# Patient Record
Sex: MALE | Race: WHITE | NOT HISPANIC OR LATINO | Employment: OTHER | URBAN - METROPOLITAN AREA
[De-identification: names, ages, dates, MRNs, and addresses within clinical notes are randomized per-mention and may not be internally consistent; named-entity substitution may affect disease eponyms.]

---

## 2019-10-17 ENCOUNTER — TELEPHONE (OUTPATIENT)
Dept: UROLOGY | Facility: AMBULATORY SURGERY CENTER | Age: 49
End: 2019-10-17

## 2019-12-26 ENCOUNTER — OFFICE VISIT (OUTPATIENT)
Dept: UROLOGY | Facility: CLINIC | Age: 49
End: 2019-12-26
Payer: COMMERCIAL

## 2019-12-26 ENCOUNTER — TELEPHONE (OUTPATIENT)
Dept: UROLOGY | Facility: MEDICAL CENTER | Age: 49
End: 2019-12-26

## 2019-12-26 ENCOUNTER — APPOINTMENT (EMERGENCY)
Dept: RADIOLOGY | Facility: HOSPITAL | Age: 49
End: 2019-12-26
Payer: COMMERCIAL

## 2019-12-26 ENCOUNTER — HOSPITAL ENCOUNTER (EMERGENCY)
Facility: HOSPITAL | Age: 49
Discharge: HOME | End: 2019-12-26
Admitting: EMERGENCY MEDICINE
Payer: COMMERCIAL

## 2019-12-26 VITALS
SYSTOLIC BLOOD PRESSURE: 119 MMHG | TEMPERATURE: 98 F | BODY MASS INDEX: 36.4 KG/M2 | HEART RATE: 77 BPM | WEIGHT: 260 LBS | OXYGEN SATURATION: 99 % | HEIGHT: 71 IN | DIASTOLIC BLOOD PRESSURE: 74 MMHG | RESPIRATION RATE: 15 BRPM

## 2019-12-26 VITALS
DIASTOLIC BLOOD PRESSURE: 84 MMHG | HEIGHT: 71 IN | BODY MASS INDEX: 37.24 KG/M2 | SYSTOLIC BLOOD PRESSURE: 134 MMHG | WEIGHT: 266 LBS | HEART RATE: 77 BPM

## 2019-12-26 DIAGNOSIS — N52.9 ERECTILE DYSFUNCTION, UNSPECIFIED ERECTILE DYSFUNCTION TYPE: Primary | ICD-10-CM

## 2019-12-26 DIAGNOSIS — S83.92XA LEFT KNEE SPRAIN: Primary | ICD-10-CM

## 2019-12-26 DIAGNOSIS — S93.402A LEFT ANKLE SPRAIN: ICD-10-CM

## 2019-12-26 LAB
SL AMB  POCT GLUCOSE, UA: NORMAL
SL AMB LEUKOCYTE ESTERASE,UA: NORMAL
SL AMB POCT BILIRUBIN,UA: NORMAL
SL AMB POCT BLOOD,UA: NORMAL
SL AMB POCT CLARITY,UA: CLEAR
SL AMB POCT COLOR,UA: YELLOW
SL AMB POCT KETONES,UA: NORMAL
SL AMB POCT NITRITE,UA: NORMAL
SL AMB POCT PH,UA: 5
SL AMB POCT SPECIFIC GRAVITY,UA: 1.02
SL AMB POCT URINE PROTEIN: NORMAL
SL AMB POCT UROBILINOGEN: 0.2

## 2019-12-26 PROCEDURE — 81002 URINALYSIS NONAUTO W/O SCOPE: CPT | Performed by: PHYSICIAN ASSISTANT

## 2019-12-26 PROCEDURE — 73610 X-RAY EXAM OF ANKLE: CPT

## 2019-12-26 PROCEDURE — 99283 EMERGENCY DEPT VISIT LOW MDM: CPT

## 2019-12-26 PROCEDURE — 99284 EMERGENCY DEPT VISIT MOD MDM: CPT | Performed by: PHYSICIAN ASSISTANT

## 2019-12-26 PROCEDURE — 73564 X-RAY EXAM KNEE 4 OR MORE: CPT

## 2019-12-26 PROCEDURE — 99203 OFFICE O/P NEW LOW 30 MIN: CPT | Performed by: PHYSICIAN ASSISTANT

## 2019-12-26 RX ORDER — ATORVASTATIN CALCIUM 80 MG/1
TABLET, FILM COATED ORAL
COMMUNITY
Start: 2019-10-22 | End: 2020-10-27 | Stop reason: CLARIF

## 2019-12-26 RX ORDER — TACROLIMUS 1 MG/1
2 CAPSULE ORAL EVERY 12 HOURS SCHEDULED
COMMUNITY
Start: 2018-01-03

## 2019-12-26 RX ORDER — CIPROFLOXACIN HYDROCHLORIDE 3.5 MG/ML
SOLUTION/ DROPS TOPICAL
COMMUNITY
Start: 2019-11-26 | End: 2021-04-06 | Stop reason: ALTCHOICE

## 2019-12-26 RX ORDER — CARVEDILOL 12.5 MG/1
TABLET ORAL
COMMUNITY
Start: 2019-12-17 | End: 2020-10-27 | Stop reason: CLARIF

## 2019-12-26 RX ORDER — PANTOPRAZOLE SODIUM 40 MG/1
TABLET, DELAYED RELEASE ORAL
COMMUNITY
Start: 2019-11-26 | End: 2020-10-27 | Stop reason: CLARIF

## 2019-12-26 RX ORDER — GLUCOSAMINE/CHONDROIT/AO MVIT5 400-300 MG
1 TABLET ORAL
COMMUNITY

## 2019-12-26 RX ORDER — INSULIN DEGLUDEC INJECTION 100 U/ML
INJECTION, SOLUTION SUBCUTANEOUS
COMMUNITY
Start: 2019-11-07 | End: 2020-10-27 | Stop reason: ALTCHOICE

## 2019-12-26 RX ORDER — INSULIN LISPRO 100 [IU]/ML
INJECTION, SOLUTION INTRAVENOUS; SUBCUTANEOUS
COMMUNITY
Start: 2019-11-04 | End: 2020-10-27 | Stop reason: ALTCHOICE

## 2019-12-26 RX ORDER — PREDNISONE 1 MG/1
5 TABLET ORAL DAILY
COMMUNITY
Start: 2018-04-02

## 2019-12-26 RX ORDER — ACETAMINOPHEN 325 MG/1
650 TABLET ORAL ONCE
Status: COMPLETED | OUTPATIENT
Start: 2019-12-26 | End: 2019-12-26

## 2019-12-26 RX ORDER — PANTOPRAZOLE SODIUM 40 MG/1
40 TABLET, DELAYED RELEASE ORAL DAILY
COMMUNITY
Start: 2019-08-28

## 2019-12-26 RX ORDER — AMLODIPINE BESYLATE 10 MG/1
10 TABLET ORAL DAILY
COMMUNITY
End: 2020-10-27 | Stop reason: ALTCHOICE

## 2019-12-26 RX ORDER — TRAMADOL HYDROCHLORIDE 50 MG/1
50 TABLET ORAL EVERY 6 HOURS PRN
Qty: 4 TABLET | Refills: 0 | Status: SHIPPED | OUTPATIENT
Start: 2019-12-26 | End: 2020-10-27 | Stop reason: ALTCHOICE

## 2019-12-26 RX ORDER — SILDENAFIL 100 MG/1
TABLET, FILM COATED ORAL
COMMUNITY
Start: 2019-08-01 | End: 2020-10-27 | Stop reason: ALTCHOICE

## 2019-12-26 RX ORDER — TACROLIMUS 1 MG/1
1 CAPSULE ORAL EVERY 12 HOURS SCHEDULED
COMMUNITY
End: 2020-10-27 | Stop reason: CLARIF

## 2019-12-26 RX ORDER — MYCOPHENOLIC ACID 180 MG/1
720 TABLET, DELAYED RELEASE ORAL 2 TIMES DAILY
COMMUNITY

## 2019-12-26 RX ORDER — CARVEDILOL 12.5 MG/1
12.5 TABLET ORAL 2 TIMES DAILY WITH MEALS
COMMUNITY
Start: 2018-02-18

## 2019-12-26 RX ORDER — ATORVASTATIN CALCIUM 80 MG/1
80 TABLET, FILM COATED ORAL DAILY
COMMUNITY
Start: 2018-04-03

## 2019-12-26 RX ORDER — LEVOFLOXACIN 500 MG/1
TABLET, FILM COATED ORAL
COMMUNITY
Start: 2019-11-27 | End: 2020-10-27 | Stop reason: ALTCHOICE

## 2019-12-26 RX ORDER — INSULIN LISPRO 100 [IU]/ML
40 INJECTION, SOLUTION INTRAVENOUS; SUBCUTANEOUS
COMMUNITY
Start: 2019-11-07 | End: 2020-10-27 | Stop reason: ALTCHOICE

## 2019-12-26 RX ADMIN — ACETAMINOPHEN 650 MG: 325 TABLET, FILM COATED ORAL at 12:23

## 2019-12-26 NOTE — ED PROVIDER NOTES
History  Chief Complaint   Patient presents with    Knee Injury     States slipped and twisted L knee and ankle last week  Reports prior surgery to L knee, and c/o medial knee pain    Ankle Injury     20-year-old male comes in today complaining of left knee pain and left posterior ankle pain status post slipping on ice 1 week ago and falling  He reports that he thought the pain would be better by now, however it isn't  Reports some difficulty ambulating due to the pain in his ankle and knee  Prior to Admission Medications   Prescriptions Last Dose Informant Patient Reported? Taking? Cholecalciferol 50 MCG (2000 UT) CAPS Not Taking at Unknown time Self Yes No   Sig: Take 1 capsule by mouth   HUMALOG KWIKPEN 100 units/mL injection pen  Self Yes Yes   ONE TOUCH ULTRA TEST test strip  Self Yes Yes   Sig: use 1 TEST STRIP to TEST BLOOD SUGAR four times a day   Pediatric Multivitamins-Fl (POLY-VI-VIKI) 0 25 MG/ML SUSP Not Taking at Unknown time Self Yes No   Sig: Take 1 mL by mouth   TRESIBA FLEXTOUCH 100 units/mL injection pen Not Taking at Unknown time Self Yes No   amLODIPine (NORVASC) 10 mg tablet   Yes Yes   Sig: Take 10 mg by mouth daily   atorvastatin (LIPITOR) 80 mg tablet  Self Yes Yes   atorvastatin (LIPITOR) 80 mg tablet Not Taking at Unknown time Self Yes No   carvedilol (COREG) 12 5 mg tablet  Self Yes Yes   carvedilol (COREG) 12 5 mg tablet Not Taking at Unknown time Self Yes No   ciprofloxacin (CILOXAN) 0 3 % ophthalmic solution Not Taking at Unknown time Self Yes No   insulin degludec (TRESIBA FLEXTOUCH) 100 units/mL injection pen  Self Yes Yes   Sig: INJECT SUBCUTANEOUSLY 80  UNITS DAILY   insulin lispro (HUMALOG KWIKPEN) 100 units/mL injection pen  Self Yes Yes   Sig: INJECT SUBCUTANEOUSLY 16  UNITS FOR BREAKFAST, LUNCH, AND 19 UNITS FOR DINNER  WITH ADDITIONAL COVERAGE  SCALE  MAX 60 UNITS DAILY     levofloxacin (LEVAQUIN) 500 mg tablet Not Taking at Unknown time Self Yes No mycophenolic acid (MYFORTIC) 664 mg EC tablet   Yes Yes   Sig: Take 720 mg by mouth 2 (two) times a day   pantoprazole (PROTONIX) 40 mg tablet  Self Yes Yes   pantoprazole (PROTONIX) 40 mg tablet Not Taking at Unknown time Self Yes No   predniSONE 5 mg tablet  Self Yes Yes   pseudoephedrine-acetaminophen (TYLENOL SINUS)  MG TABS Not Taking at Unknown time Self Yes No   Sig: Take 81 mg by mouth daily   sildenafil (VIAGRA) 100 mg tablet  Self Yes Yes   tacrolimus (PROGRAF) 1 mg capsule  Self Yes Yes   Sig: Take 2 mg by mouth every 12 (twelve) hours    tacrolimus (PROGRAF) 1 mg capsule   Yes Yes   Sig: Take 1 mg by mouth every 12 (twelve) hours   ticagrelor (BRILINTA) 90 MG   Yes Yes   Sig: Take 90 mg by mouth every 12 (twelve) hours      Facility-Administered Medications: None       Past Medical History:   Diagnosis Date    [facility]     Hyperlipidemia     Hypertension     Renal disorder     kidney transplant L side    Tear meniscus knee        Past Surgical History:   Procedure Laterality Date    APPENDECTOMY      KNEE SURGERY      NEPHRECTOMY TRANSPLANTED ORGAN Left        History reviewed  No pertinent family history  I have reviewed and agree with the history as documented  Social History     Tobacco Use    Smoking status: Light Tobacco Smoker     Types: Cigarettes    Smokeless tobacco: Never Used    Tobacco comment: 5 cigaretes per day    Substance Use Topics    Alcohol use: Yes     Comment: social    Drug use: Yes     Types: Marijuana        Review of Systems   Constitutional: Negative for fatigue and fever  HENT: Negative for ear pain, rhinorrhea and sore throat  Eyes: Negative for visual disturbance  Respiratory: Negative for cough and shortness of breath  Cardiovascular: Negative for chest pain  Gastrointestinal: Negative for abdominal pain, diarrhea, nausea and vomiting     Musculoskeletal: Positive for arthralgias, gait problem and joint swelling  Negative for back pain  Neurological: Negative for headaches  Physical Exam  Physical Exam   Constitutional: He is oriented to person, place, and time  He appears well-developed and well-nourished  HENT:   Head: Normocephalic and atraumatic  Eyes: Conjunctivae and EOM are normal    Neck: Normal range of motion  Pulmonary/Chest: Effort normal    Musculoskeletal: Normal range of motion  Legs:  Neurological: He is alert and oriented to person, place, and time  Skin: Skin is warm and dry  Psychiatric: He has a normal mood and affect  His behavior is normal        Vital Signs  ED Triage Vitals [12/26/19 1154]   Temperature Pulse Respirations Blood Pressure SpO2   98 °F (36 7 °C) 77 15 119/74 99 %      Temp Source Heart Rate Source Patient Position - Orthostatic VS BP Location FiO2 (%)   Oral Monitor Sitting Left arm --      Pain Score       5           Vitals:    12/26/19 1154   BP: 119/74   Pulse: 77   Patient Position - Orthostatic VS: Sitting         Visual Acuity      ED Medications  Medications   acetaminophen (TYLENOL) tablet 650 mg (650 mg Oral Given 12/26/19 1223)       Diagnostic Studies  Results Reviewed     None                 XR ankle 3+ views LEFT   ED Interpretation by Sadie Barnett PA-C (12/26 1258)   Atherosclerotic disease, however no acute fracture      XR knee 4+ vw left injury   ED Interpretation by Sadie Barnett PA-C (12/26 1300)   NAD                 Procedures  Procedures         ED Course                               MDM      Disposition  Final diagnoses:   Left knee sprain   Left ankle sprain     Time reflects when diagnosis was documented in both MDM as applicable and the Disposition within this note     Time User Action Codes Description Comment    12/26/2019  1:00 PM Anh De León Add [S83  92XA] Left knee sprain     12/26/2019  1:01 PM Anh De León Add [P22 963A] Left ankle sprain       ED Disposition     ED Disposition Condition Date/Time Comment    Discharge Stable Thu Dec 26, 2019  1:00 PM Albert Jim discharge to home/self care  Follow-up Information     Follow up With Specialties Details Why Contact Info Additional [phone] [facility] Schedule an appointment as soon as possible for a visit   [address] [place] [id number]  600 [facility], [address], [place], [place], [address] [phone]          Patient's Medications   Discharge Prescriptions    No medications on file     No discharge procedures on file      ED Provider  Electronically Signed by           José Manuel Pedersen PA-C  12/26/19 0973

## 2019-12-26 NOTE — TELEPHONE ENCOUNTER
I called [facility] to verify what exactly transpired with the Trimix 10-30-1 prescription  I spoke with Ivette Silva Ph and she tried multiple time to explain to the patient that Trimix is a non-covered compounded medication and is non-covered  The pharmacist is correct in that Trimix is non-covered

## 2019-12-26 NOTE — TELEPHONE ENCOUNTER
Patient seen by Wilmer Alvarez in the [facility]  Patient called to report that the new Trimix medication needs a prior authorization  His Insurance card for medication is   [facility] [employer]   ID [id number]      Jammie Curry #[id number]      [facility]  [address] [place] [address] # Raymon Armendariz       Patient can be reached at [phone]  Thank you

## 2019-12-26 NOTE — PROGRESS NOTES
1  Erectile dysfunction, unspecified erectile dysfunction type  POCT urine dip         Assessment and plan:       1  Erectile dysfunction  -I reviewed with the patient that he has failed maximum strength oral medications  Reviewed options of PDE 5 inhibitors in conjunction with a vacuum assisted device versus intracavernosal injections  At this point patient will likely benefit from intracavernosal injections  Risk profile reviewed  Patient verbalized understanding  Patient will follow up in the near future for Tri Mix teaching  He was instructed to  his medication prior to his appointment and bring with him for education  Questions answered  2  History of renal transplant  -recommend hydration, avoidance of next for toxin, and continued following with his nephrologist         Cora Sanabria PA-C      Chief Complaint     New patient erectile dysfunction    History of Present Illness     Jaron Patino is a 44 y o  male presenting today as a new patient for erectile dysfunction  Patient states that he has had erectile dysfunction over the course of the past 10 years  This likely attributed due to his history of uncontrolled diabetes and hypertension  Patient previously had been tried on Cialis without benefit  He has been managed on Viagra 100 mg as needed for period of time  He states that this previously was efficacious however since has discontinued  He is only able to achieve erection for approximately 5 minutes and then has prompt detumescence  Patient reports he previously had seen a urologist prior to his renal transplant and had tried injection therapy approximately 1 time  This was not efficacious and then was lost to follow-up due to his other medical conditions  Patient is overall happy with his lower urinary tract symptoms  Denies any gross hematuria, dysuria, suprapubic pressure, flank pain, fevers, or chills    Follows closely with a nephrologist     Medical comorbidities include diabetes, chronic kidney disease with a history of renal transplant  Review of Systems     Review of Systems   Constitutional: Negative for activity change, appetite change, chills, diaphoresis, fatigue, fever and unexpected weight change  Respiratory: Negative for chest tightness and shortness of breath  Cardiovascular: Negative for chest pain, palpitations and leg swelling  Gastrointestinal: Negative for abdominal distention, abdominal pain, constipation, diarrhea, nausea and vomiting  Genitourinary: Negative for decreased urine volume, difficulty urinating, dysuria, enuresis, flank pain, frequency, genital sores, hematuria and urgency  Musculoskeletal: Negative for back pain, gait problem and myalgias  Skin: Negative for color change, pallor, rash and wound  Psychiatric/Behavioral: Negative for behavioral problems  The patient is not nervous/anxious  Allergies     Allergies   Allergen Reactions    Other Hives     Vinegar       Physical Exam     Physical Exam   Constitutional: He is oriented to person, place, and time  He appears well-developed and well-nourished  No distress  HENT:   Head: Normocephalic and atraumatic  Right Ear: External ear normal    Left Ear: External ear normal    Eyes: Conjunctivae are normal  Right eye exhibits no discharge  Left eye exhibits no discharge  Neck: Normal range of motion  No tracheal deviation present  Pulmonary/Chest: Effort normal  No respiratory distress  Musculoskeletal: Normal range of motion  He exhibits no edema or deformity  Neurological: He is alert and oriented to person, place, and time  Skin: Skin is warm and dry  No rash noted  He is not diaphoretic  No erythema  Psychiatric: He has a normal mood and affect   His behavior is normal          Vital Signs     Vitals:    12/26/19 1016   BP: 134/84   BP Location: Left arm   Patient Position: Sitting   Cuff Size: Standard   Pulse: 77   Weight: 121 kg (266 lb)   Height: 5' 11" (1 803 m)         Current Medications       Current Outpatient Medications:     atorvastatin (LIPITOR) 80 mg tablet, , Disp: , Rfl:     atorvastatin (LIPITOR) 80 mg tablet, , Disp: , Rfl:     carvedilol (COREG) 12 5 mg tablet, , Disp: , Rfl:     carvedilol (COREG) 12 5 mg tablet, , Disp: , Rfl:     Cholecalciferol 50 MCG (2000 UT) CAPS, Take 1 capsule by mouth, Disp: , Rfl:     ciprofloxacin (CILOXAN) 0 3 % ophthalmic solution, , Disp: , Rfl:     HUMALOG KWIKPEN 100 units/mL injection pen, , Disp: , Rfl:     insulin degludec (TRESIBA FLEXTOUCH) 100 units/mL injection pen, INJECT SUBCUTANEOUSLY 80  UNITS DAILY, Disp: , Rfl:     insulin lispro (HUMALOG KWIKPEN) 100 units/mL injection pen, INJECT SUBCUTANEOUSLY 16  UNITS FOR BREAKFAST, LUNCH, AND 19 UNITS FOR DINNER  WITH ADDITIONAL COVERAGE  SCALE  MAX 60 UNITS DAILY  , Disp: , Rfl:     levofloxacin (LEVAQUIN) 500 mg tablet, , Disp: , Rfl:     ONE TOUCH ULTRA TEST test strip, use 1 TEST STRIP to TEST BLOOD SUGAR four times a day, Disp: , Rfl: 0    pantoprazole (PROTONIX) 40 mg tablet, , Disp: , Rfl:     pantoprazole (PROTONIX) 40 mg tablet, , Disp: , Rfl:     Pediatric Multivitamins-Fl (POLY-VI-VIKI) 0 25 MG/ML SUSP, Take 1 mL by mouth, Disp: , Rfl:     predniSONE 5 mg tablet, , Disp: , Rfl:     pseudoephedrine-acetaminophen (TYLENOL SINUS)  MG TABS, Take 81 mg by mouth daily, Disp: , Rfl:     sildenafil (VIAGRA) 100 mg tablet, , Disp: , Rfl:     tacrolimus (PROGRAF) 1 mg capsule, Take 2 mg by mouth every 12 (twelve) hours , Disp: , Rfl:     TRESIBA FLEXTOUCH 100 units/mL injection pen, , Disp: , Rfl:     amLODIPine (NORVASC) 10 mg tablet, Take 10 mg by mouth daily, Disp: , Rfl:     mycophenolic acid (MYFORTIC) 268 mg EC tablet, Take 720 mg by mouth 2 (two) times a day, Disp: , Rfl:     tacrolimus (PROGRAF) 1 mg capsule, Take 1 mg by mouth every 12 (twelve) hours, Disp: , Rfl:     ticagrelor (BRILINTA) 90 MG, Take 90 mg by mouth every 12 (twelve) hours, Disp: , Rfl:     traMADol (ULTRAM) 50 mg tablet, Take 1 tablet (50 mg total) by mouth every 6 (six) hours as needed for moderate pain or severe pain for up to 4 doses, Disp: 4 tablet, Rfl: 0  No current facility-administered medications for this visit  Active Problems     There is no problem list on file for this patient  Past Medical History     Past Medical History:   Diagnosis Date    [facility] 1 5, [facility] as type [facility]     Hyperlipidemia     Hypertension     Renal disorder     kidney transplant L side    Tear meniscus knee          Surgical History     Past Surgical History:   Procedure Laterality Date    APPENDECTOMY      KNEE SURGERY      NEPHRECTOMY TRANSPLANTED ORGAN Left          Family History     History reviewed  No pertinent family history        Social History     Social History       Radiology 99

## 2020-01-08 ENCOUNTER — OFFICE VISIT (OUTPATIENT)
Dept: UROLOGY | Facility: CLINIC | Age: 50
End: 2020-01-08
Payer: COMMERCIAL

## 2020-01-08 VITALS
SYSTOLIC BLOOD PRESSURE: 140 MMHG | WEIGHT: 255 LBS | DIASTOLIC BLOOD PRESSURE: 98 MMHG | BODY MASS INDEX: 35.7 KG/M2 | HEART RATE: 80 BPM | HEIGHT: 71 IN

## 2020-01-08 DIAGNOSIS — N52.9 ERECTILE DYSFUNCTION, UNSPECIFIED ERECTILE DYSFUNCTION TYPE: Primary | ICD-10-CM

## 2020-01-08 PROCEDURE — 54235 NJX CORPORA CAVERNOSA RX AGT: CPT | Performed by: PHYSICIAN ASSISTANT

## 2020-01-08 NOTE — PROGRESS NOTES
1/8/2020  Alexsandra Epstein  1970  [id number]      Assessment/Plan  Erectile dysfunction    Discussion  Cha Chinchilla is a 44 y o  male with erectile dysfunction  He was provided verbal and physical demonstration of intracavernosal injection use  He was instructed on storage, disposal, and titration of the medication  He was instructed on hospital precautions for a priapism  He was able to demonstrate understanding of injection use  All questions were answered  Follow-up 6 months for symptom reassessment with PSA PTV    History of Present Illness  44 y o  male with ED presents today for intracavernosal injection teaching  Vitals:    01/08/20 1126   BP: 140/98   Pulse: 80   Weight: 116 kg (255 lb)   Height: 5' 11" (1 803 m)       Procedure    Procedure: intracavernosal injection  Indication: Erectile dysfunction  Discussed:  Proper technique and instruction for intracavernosal injection were explained to the patient  risks of injection including but not limited to pain, bleeding, infection, fibrosis, and priapism (including the potential complications of priapism) were discussed  Procedure: The skin overlying the injection site was then prepped with Alcohol  0 1 ml of was injected into the corpora cavernosum  Patient Status:  the patient was closely monitored for 10 minutes and reassessed  He tolerated the procedure well  Response to intracavernosal injection was fair   Complications:  No complications noted  Instructions:  the patient was counseled about priapism and instructed to return to the clinic or the emergency room if his erection lasted up to 4 hours  the patient expressed understanding of the injection technique and felt able to perform self-injection

## 2020-01-15 NOTE — TELEPHONE ENCOUNTER
Patient managed by Hakeem John is calling to say he did pay out of pocket for medication    He was waiting for a response call to see if there would be prior authorization to help with cost  Please advise

## 2020-01-16 NOTE — TELEPHONE ENCOUNTER
I returned the call to the patient and informed him that compounded medication are NON-COVERED by insurance  Patient verbalized understanding  No further action required

## 2020-01-28 ENCOUNTER — TELEPHONE (OUTPATIENT)
Dept: UROLOGY | Facility: MEDICAL CENTER | Age: 50
End: 2020-01-28

## 2020-01-28 NOTE — TELEPHONE ENCOUNTER
Patient was seen by Cindy Downey in [facility] office  Patient is calling to advise medication for his Ed is not working    Patient requesting to speak to Cindy Downey

## 2020-01-29 NOTE — TELEPHONE ENCOUNTER
Called and left second message for patient to call the office back  Message was left on "home" and "home phone" numbers  Office number was left in the message

## 2020-01-30 NOTE — TELEPHONE ENCOUNTER
Called and left third message for patient to call the office back  Message was left on "home phone" numbers  Called patient on other number and was able to speak to him  Patient reports that he is using the trimix injections and that he was told that he could go up to 8 on the injections  Patient reports that he has gone up to 8 and it is not working at all  Patient wants to know what else he can do  Please advise  Thank you

## 2020-02-03 ENCOUNTER — TELEPHONE (OUTPATIENT)
Dept: UROLOGY | Facility: AMBULATORY SURGERY CENTER | Age: 50
End: 2020-02-03

## 2020-02-03 NOTE — TELEPHONE ENCOUNTER
We can increase the strength of the injection - please have patient confirm which strength he is currently on and we can titrate up accordingly  With a stronger strength patient will need to restart down at 0 1mL injections

## 2020-02-03 NOTE — TELEPHONE ENCOUNTER
Called and asked patient the strength of the injection he is taking  He looked at his bottle and said it was 10 mcg - 30 mg  Please advise

## 2020-02-03 NOTE — TELEPHONE ENCOUNTER
Called and left message for patient  Made him aware that we have sent in a new script to [facility] with an increase in the strength of the injection  In message stated that with a stronger strength he will need to restart down at 0 1mL and titrate up if needed

## 2020-02-03 NOTE — TELEPHONE ENCOUNTER
I have completed an order sent for [facility] for this medication

## 2020-02-06 NOTE — TELEPHONE ENCOUNTER
I apologize for the misunderstanding  I have completed the order for TriMix + and have provided it to clerical staff to be faxed to the pharmacy

## 2020-02-06 NOTE — TELEPHONE ENCOUNTER
Called and left message for patient  In message stated that we have completed the [facility] for TriMix [facility]

## 2020-02-06 NOTE — TELEPHONE ENCOUNTER
Corinna from [facility] called and said patient is asking for a stronger strength for Trimix  Please call pharmacy at [phone]

## 2020-02-06 NOTE — TELEPHONE ENCOUNTER
Patient called to say he the prescription that was sent in is the same strength and he did not pick it up  Stated he would like to speak to nurse

## 2020-03-06 NOTE — PROGRESS NOTES
Assessment and plan:       1  Erectile dysfunction  - Patient has failed oral PDE 5 inhibitors and inter cavernosal injections  - At this point the patient is interested in pursuing penile prosthesis  Linda Maurice - He has been referred to Dr Raimundo Maharaj who specializes in this type of treatment  He will follow up in the near future with this provider  Joanna Denver, PA-C      Chief Complaint     Chief Complaint   Patient presents with    Erectile Dysfunction         History of Present Illness     Gene Hamm is a 44 y o  male patient known to our service for erectile dysfunction  He underwent teaching for Tri Mix injection use on 01/08/2020  Patient contact our office on 01/28/2020 to report that he had tried up to 0 8 mL without satisfactory response  He was then sent a prescription of Tri Mix Plus  He reports that he has used up to 0 8 mL of Tri Mix +without any erectile response  The patient is on many prescription medications and would like to limit further medications if possible  At this time he would like to consider penile prosthesis  Laboratory     No results found for: CREATININE    No results found for: PSA    No results found for this or any previous visit (from the past 1 hour(s))  Review of Systems     Review of Systems   Constitutional: Negative for chills and fever  HENT: Negative  Eyes: Negative  Respiratory: Negative for shortness of breath  Cardiovascular: Negative for chest pain  Gastrointestinal: Negative for constipation, diarrhea, nausea and vomiting  Genitourinary: Negative for difficulty urinating, dysuria, enuresis, flank pain, frequency, hematuria and urgency  Musculoskeletal: Negative  Allergies     Allergies   Allergen Reactions    Other Hives     Vinegar       Physical Exam     Physical Exam   Constitutional: He is oriented to person, place, and time  He appears well-developed and well-nourished  No distress     HENT:   Head: Normocephalic and atraumatic  Right Ear: External ear normal    Left Ear: External ear normal    Nose: Nose normal    Eyes: Right eye exhibits no discharge  Left eye exhibits no discharge  No scleral icterus  Cardiovascular: Normal rate  Pulmonary/Chest: Effort normal    Musculoskeletal:   Ambulates independently   Neurological: He is alert and oriented to person, place, and time  Skin: Skin is warm and dry  He is not diaphoretic  Psychiatric: He has a normal mood and affect  His behavior is normal  Judgment and thought content normal    Nursing note and vitals reviewed  Vital Signs     Vitals:    03/10/20 1330   BP: 140/76   BP Location: Right arm   Patient Position: Sitting   Cuff Size: Adult   Pulse: 95   Weight: 117 kg (259 lb)   Height: 5' 11" (1 803 m)         Current Medications       Current Outpatient Medications:     amLODIPine (NORVASC) 10 mg tablet, Take 10 mg by mouth daily, Disp: , Rfl:     atorvastatin (LIPITOR) 80 mg tablet, , Disp: , Rfl:     atorvastatin (LIPITOR) 80 mg tablet, , Disp: , Rfl:     carvedilol (COREG) 12 5 mg tablet, , Disp: , Rfl:     carvedilol (COREG) 12 5 mg tablet, , Disp: , Rfl:     Cholecalciferol 50 MCG (2000 UT) CAPS, Take 1 capsule by mouth, Disp: , Rfl:     ciprofloxacin (CILOXAN) 0 3 % ophthalmic solution, , Disp: , Rfl:     HUMALOG KWIKPEN 100 units/mL injection pen, , Disp: , Rfl:     insulin degludec (TRESIBA FLEXTOUCH) 100 units/mL injection pen, INJECT SUBCUTANEOUSLY 80  UNITS DAILY, Disp: , Rfl:     insulin lispro (HUMALOG KWIKPEN) 100 units/mL injection pen, INJECT SUBCUTANEOUSLY 16  UNITS FOR BREAKFAST, LUNCH, AND 19 UNITS FOR DINNER  WITH ADDITIONAL COVERAGE  SCALE  MAX 60 UNITS DAILY  , Disp: , Rfl:     levofloxacin (LEVAQUIN) 500 mg tablet, , Disp: , Rfl:     mycophenolic acid (MYFORTIC) 168 mg EC tablet, Take 720 mg by mouth 2 (two) times a day, Disp: , Rfl:     ONE TOUCH ULTRA TEST test strip, use 1 TEST STRIP to TEST BLOOD SUGAR four times a day, Disp: , Rfl: 0    pantoprazole (PROTONIX) 40 mg tablet, , Disp: , Rfl:     pantoprazole (PROTONIX) 40 mg tablet, , Disp: , Rfl:     Pediatric Multivitamins-Fl (POLY-VI-VIKI) 0 25 MG/ML SUSP, Take 1 mL by mouth, Disp: , Rfl:     predniSONE 5 mg tablet, , Disp: , Rfl:     pseudoephedrine-acetaminophen (TYLENOL SINUS)  MG TABS, Take 81 mg by mouth daily, Disp: , Rfl:     sildenafil (VIAGRA) 100 mg tablet, , Disp: , Rfl:     tacrolimus (PROGRAF) 1 mg capsule, Take 2 mg by mouth every 12 (twelve) hours , Disp: , Rfl:     tacrolimus (PROGRAF) 1 mg capsule, Take 1 mg by mouth every 12 (twelve) hours, Disp: , Rfl:     ticagrelor (BRILINTA) 90 MG, Take 90 mg by mouth every 12 (twelve) hours, Disp: , Rfl:     traMADol (ULTRAM) 50 mg tablet, Take 1 tablet (50 mg total) by mouth every 6 (six) hours as needed for moderate pain or severe pain for up to 4 doses, Disp: 4 tablet, Rfl: 0    TRESIBA FLEXTOUCH 100 units/mL injection pen, , Disp: , Rfl:       Active Problems     There is no problem list on file for this patient  Past Medical History     Past Medical History:   Diagnosis Date    Diabetes 1 5, managed as type 2 ([facility] [address] )     Hyperlipidemia     Hypertension     Renal disorder     kidney transplant L side    Tear meniscus knee          Surgical History     Past Surgical History:   Procedure Laterality Date    APPENDECTOMY      KNEE SURGERY      NEPHRECTOMY TRANSPLANTED ORGAN Left          Family History     History reviewed  No pertinent family history        Social History     Social History       Radiology

## 2020-03-10 ENCOUNTER — OFFICE VISIT (OUTPATIENT)
Dept: UROLOGY | Facility: CLINIC | Age: 50
End: 2020-03-10
Payer: COMMERCIAL

## 2020-03-10 VITALS
WEIGHT: 259 LBS | HEART RATE: 95 BPM | SYSTOLIC BLOOD PRESSURE: 140 MMHG | DIASTOLIC BLOOD PRESSURE: 76 MMHG | BODY MASS INDEX: 36.26 KG/M2 | HEIGHT: 71 IN

## 2020-03-10 DIAGNOSIS — N52.9 ERECTILE DYSFUNCTION, UNSPECIFIED ERECTILE DYSFUNCTION TYPE: Primary | ICD-10-CM

## 2020-03-10 PROCEDURE — 99213 OFFICE O/P EST LOW 20 MIN: CPT | Performed by: PHYSICIAN ASSISTANT

## 2020-10-27 ENCOUNTER — OFFICE VISIT (OUTPATIENT)
Dept: INTERNAL MEDICINE CLINIC | Facility: CLINIC | Age: 50
End: 2020-10-27
Payer: COMMERCIAL

## 2020-10-27 VITALS
WEIGHT: 262.8 LBS | TEMPERATURE: 97.6 F | DIASTOLIC BLOOD PRESSURE: 86 MMHG | SYSTOLIC BLOOD PRESSURE: 122 MMHG | HEIGHT: 71 IN | BODY MASS INDEX: 36.79 KG/M2 | HEART RATE: 74 BPM

## 2020-10-27 DIAGNOSIS — Z94.0 KIDNEY TRANSPLANT STATUS, LIVING RELATED DONOR: ICD-10-CM

## 2020-10-27 DIAGNOSIS — E11.69 MIXED HYPERLIPIDEMIA DUE TO TYPE 2 DIABETES MELLITUS (HCC): Primary | ICD-10-CM

## 2020-10-27 DIAGNOSIS — Z79.4 TYPE 2 DIABETES MELLITUS WITH HYPERGLYCEMIA, WITH LONG-TERM CURRENT USE OF INSULIN (HCC): ICD-10-CM

## 2020-10-27 DIAGNOSIS — E66.9 OBESITY (BMI 30-39.9): ICD-10-CM

## 2020-10-27 DIAGNOSIS — I10 ESSENTIAL HYPERTENSION: ICD-10-CM

## 2020-10-27 DIAGNOSIS — Z79.4 INSULIN LONG-TERM USE (HCC): ICD-10-CM

## 2020-10-27 DIAGNOSIS — E78.2 MIXED HYPERLIPIDEMIA DUE TO TYPE 2 DIABETES MELLITUS (HCC): Primary | ICD-10-CM

## 2020-10-27 DIAGNOSIS — E11.65 TYPE 2 DIABETES MELLITUS WITH HYPERGLYCEMIA, WITH LONG-TERM CURRENT USE OF INSULIN (HCC): ICD-10-CM

## 2020-10-27 DIAGNOSIS — N28.9 RENAL DISORDER: ICD-10-CM

## 2020-10-27 DIAGNOSIS — Z12.11 COLON CANCER SCREENING: ICD-10-CM

## 2020-10-27 PROCEDURE — 3725F SCREEN DEPRESSION PERFORMED: CPT | Performed by: NURSE PRACTITIONER

## 2020-10-27 PROCEDURE — 3008F BODY MASS INDEX DOCD: CPT | Performed by: NURSE PRACTITIONER

## 2020-10-27 PROCEDURE — 3079F DIAST BP 80-89 MM HG: CPT | Performed by: NURSE PRACTITIONER

## 2020-10-27 PROCEDURE — 99214 OFFICE O/P EST MOD 30 MIN: CPT | Performed by: NURSE PRACTITIONER

## 2020-10-27 PROCEDURE — 3074F SYST BP LT 130 MM HG: CPT | Performed by: NURSE PRACTITIONER

## 2020-10-27 PROCEDURE — 3066F NEPHROPATHY DOC TX: CPT | Performed by: NURSE PRACTITIONER

## 2020-10-27 RX ORDER — OFLOXACIN 3 MG/ML
SOLUTION/ DROPS OPHTHALMIC
COMMUNITY
Start: 2020-10-16 | End: 2021-04-06 | Stop reason: ALTCHOICE

## 2020-10-27 RX ORDER — LINAGLIPTIN 5 MG/1
5 TABLET, FILM COATED ORAL DAILY
COMMUNITY
Start: 2020-08-21 | End: 2020-10-27 | Stop reason: ALTCHOICE

## 2020-10-27 RX ORDER — KETOROLAC TROMETHAMINE 5 MG/ML
SOLUTION OPHTHALMIC
COMMUNITY
Start: 2020-10-16 | End: 2021-04-06 | Stop reason: ALTCHOICE

## 2020-10-27 RX ORDER — PREDNISOLONE ACETATE 10 MG/ML
SUSPENSION/ DROPS OPHTHALMIC
COMMUNITY
Start: 2020-10-16 | End: 2021-04-06 | Stop reason: ALTCHOICE

## 2020-12-18 ENCOUNTER — VBI (OUTPATIENT)
Dept: ADMINISTRATIVE | Facility: OTHER | Age: 50
End: 2020-12-18

## 2021-01-26 ENCOUNTER — TELEPHONE (OUTPATIENT)
Dept: INTERNAL MEDICINE CLINIC | Facility: CLINIC | Age: 51
End: 2021-01-26

## 2021-01-26 DIAGNOSIS — Z79.4 TYPE 2 DIABETES MELLITUS WITH HYPERGLYCEMIA, WITH LONG-TERM CURRENT USE OF INSULIN (HCC): ICD-10-CM

## 2021-01-26 DIAGNOSIS — E78.2 MIXED HYPERLIPIDEMIA DUE TO TYPE 2 DIABETES MELLITUS (HCC): Primary | ICD-10-CM

## 2021-01-26 DIAGNOSIS — I10 ESSENTIAL HYPERTENSION: ICD-10-CM

## 2021-01-26 DIAGNOSIS — E11.65 TYPE 2 DIABETES MELLITUS WITH HYPERGLYCEMIA, WITH LONG-TERM CURRENT USE OF INSULIN (HCC): ICD-10-CM

## 2021-01-26 DIAGNOSIS — E11.69 MIXED HYPERLIPIDEMIA DUE TO TYPE 2 DIABETES MELLITUS (HCC): Primary | ICD-10-CM

## 2021-01-26 DIAGNOSIS — E66.9 OBESITY (BMI 30-39.9): ICD-10-CM

## 2021-01-26 DIAGNOSIS — Z12.5 PROSTATE CANCER SCREENING: ICD-10-CM

## 2021-01-26 NOTE — TELEPHONE ENCOUNTER
HAS   APPT IN   FEB  DOES HE  NEED  TO HAVE  SOME  LABS  DONE  BEFORE  HIS   APPT    CALL  PT   [phone]

## 2021-03-02 ENCOUNTER — TELEPHONE (OUTPATIENT)
Dept: UROLOGY | Facility: AMBULATORY SURGERY CENTER | Age: 51
End: 2021-03-02

## 2021-03-02 NOTE — TELEPHONE ENCOUNTER
Pt is currently sched with Dr Saba Gaona for 06/02/21 for pursuing penile prosthesis, pt states he has been trying to get an appt for over a yr for this and kept being pushed off and now the Dr he was originally to see is no longer with the practice   He wants an appt sooner than June or he needs a referral to go to another Urology Practice, pt can be reached at [phone]

## 2021-03-03 NOTE — TELEPHONE ENCOUNTER
Call placed to patient and spoke with him  Offered patient May 28th appointment with Dr Alfredo Mcarthurems  This is the soonest appointment for new patient and IPP discussion  Pt accepted this appointment

## 2021-04-05 ENCOUNTER — TELEPHONE (OUTPATIENT)
Dept: INTERNAL MEDICINE CLINIC | Facility: CLINIC | Age: 51
End: 2021-04-05

## 2021-04-05 ENCOUNTER — VBI (OUTPATIENT)
Dept: ADMINISTRATIVE | Facility: OTHER | Age: 51
End: 2021-04-05

## 2021-04-06 ENCOUNTER — OFFICE VISIT (OUTPATIENT)
Dept: INTERNAL MEDICINE CLINIC | Facility: CLINIC | Age: 51
End: 2021-04-06
Payer: COMMERCIAL

## 2021-04-06 ENCOUNTER — TELEPHONE (OUTPATIENT)
Dept: INTERNAL MEDICINE CLINIC | Facility: CLINIC | Age: 51
End: 2021-04-06

## 2021-04-06 ENCOUNTER — APPOINTMENT (OUTPATIENT)
Dept: LAB | Facility: CLINIC | Age: 51
End: 2021-04-06
Payer: COMMERCIAL

## 2021-04-06 ENCOUNTER — TELEPHONE (OUTPATIENT)
Dept: CARDIOLOGY CLINIC | Facility: CLINIC | Age: 51
End: 2021-04-06

## 2021-04-06 ENCOUNTER — TRANSCRIBE ORDERS (OUTPATIENT)
Dept: LAB | Facility: CLINIC | Age: 51
End: 2021-04-06

## 2021-04-06 VITALS
RESPIRATION RATE: 15 BRPM | TEMPERATURE: 97.7 F | OXYGEN SATURATION: 99 % | WEIGHT: 258 LBS | DIASTOLIC BLOOD PRESSURE: 78 MMHG | HEART RATE: 76 BPM | SYSTOLIC BLOOD PRESSURE: 124 MMHG | BODY MASS INDEX: 36.12 KG/M2 | HEIGHT: 71 IN

## 2021-04-06 DIAGNOSIS — E55.9 VITAMIN D DEFICIENCY: ICD-10-CM

## 2021-04-06 DIAGNOSIS — Z01.818 PRE-OP EVALUATION: ICD-10-CM

## 2021-04-06 DIAGNOSIS — Z94.0 KIDNEY TRANSPLANT STATUS, LIVING RELATED DONOR: ICD-10-CM

## 2021-04-06 DIAGNOSIS — Z79.4 INSULIN LONG-TERM USE (HCC): ICD-10-CM

## 2021-04-06 DIAGNOSIS — E11.65 TYPE 2 DIABETES MELLITUS WITH HYPERGLYCEMIA, WITH LONG-TERM CURRENT USE OF INSULIN (HCC): Primary | ICD-10-CM

## 2021-04-06 DIAGNOSIS — I10 ESSENTIAL HYPERTENSION: ICD-10-CM

## 2021-04-06 DIAGNOSIS — E13.319 RETINOPATHY DUE TO SECONDARY DIABETES MELLITUS (HCC): ICD-10-CM

## 2021-04-06 DIAGNOSIS — E66.9 OBESITY (BMI 30-39.9): ICD-10-CM

## 2021-04-06 DIAGNOSIS — J30.9 ALLERGIC RHINITIS, UNSPECIFIED SEASONALITY, UNSPECIFIED TRIGGER: ICD-10-CM

## 2021-04-06 DIAGNOSIS — E11.69 MIXED HYPERLIPIDEMIA DUE TO TYPE 2 DIABETES MELLITUS (HCC): ICD-10-CM

## 2021-04-06 DIAGNOSIS — E66.01 OBESITY, MORBID (HCC): ICD-10-CM

## 2021-04-06 DIAGNOSIS — Z79.4 TYPE 2 DIABETES MELLITUS WITH HYPERGLYCEMIA, WITH LONG-TERM CURRENT USE OF INSULIN (HCC): Primary | ICD-10-CM

## 2021-04-06 DIAGNOSIS — E78.2 MIXED HYPERLIPIDEMIA DUE TO TYPE 2 DIABETES MELLITUS (HCC): ICD-10-CM

## 2021-04-06 LAB
25(OH)D3 SERPL-MCNC: 31.8 NG/ML (ref 30–100)
SL AMB POCT HEMOGLOBIN AIC: 7.4 (ref ?–6.5)

## 2021-04-06 PROCEDURE — 82306 VITAMIN D 25 HYDROXY: CPT

## 2021-04-06 PROCEDURE — 3066F NEPHROPATHY DOC TX: CPT | Performed by: NURSE PRACTITIONER

## 2021-04-06 PROCEDURE — 99214 OFFICE O/P EST MOD 30 MIN: CPT | Performed by: NURSE PRACTITIONER

## 2021-04-06 PROCEDURE — 1036F TOBACCO NON-USER: CPT | Performed by: NURSE PRACTITIONER

## 2021-04-06 PROCEDURE — 3008F BODY MASS INDEX DOCD: CPT | Performed by: NURSE PRACTITIONER

## 2021-04-06 PROCEDURE — 3078F DIAST BP <80 MM HG: CPT | Performed by: NURSE PRACTITIONER

## 2021-04-06 PROCEDURE — 3051F HG A1C>EQUAL 7.0%<8.0%: CPT | Performed by: NURSE PRACTITIONER

## 2021-04-06 PROCEDURE — 83036 HEMOGLOBIN GLYCOSYLATED A1C: CPT | Performed by: NURSE PRACTITIONER

## 2021-04-06 PROCEDURE — 93000 ELECTROCARDIOGRAM COMPLETE: CPT | Performed by: NURSE PRACTITIONER

## 2021-04-06 PROCEDURE — 3074F SYST BP LT 130 MM HG: CPT | Performed by: NURSE PRACTITIONER

## 2021-04-06 PROCEDURE — 36415 COLL VENOUS BLD VENIPUNCTURE: CPT

## 2021-04-06 NOTE — ASSESSMENT & PLAN NOTE
Lab Results   Component Value Date    HGBA1C 7 4 (A) 04/06/2021     The patient continues to follow with endocrinology  His hemoglobin A1c in the office today is 7 4  He does have an insulin pump  He states that his 1st morning sugars have been running in the 130s range  I did recommend the patient see his endocrinologist for preoperative clearance

## 2021-04-06 NOTE — TELEPHONE ENCOUNTER
Pt called and stated that he was referred to Dr Kate Garcia for [address] (ICD-10-CM) - Essential hypertension, to be see ASAP  Please Advise if pt can be seen by any provider

## 2021-04-06 NOTE — ASSESSMENT & PLAN NOTE
Lab Results   Component Value Date    HGBA1C 7 4 (A) 04/06/2021     Continue to follow with endocrinology

## 2021-04-06 NOTE — TELEPHONE ENCOUNTER
Trying to schedule surgery w/in few weeks    saw Anibal Virgen today and she wanted him to see cardiology    he can't get an appt with cardiologist @ [place] [phone] , till June or July  He doesn't want to wait that long to get the surgery    is asking what Anibal Virgen recommends ? ??

## 2021-04-06 NOTE — TELEPHONE ENCOUNTER
Spoke with pt  Pt will come in on 04/23  Dr Donna Arteaga is that okay  Next week you schedule is to full

## 2021-04-06 NOTE — PROGRESS NOTES
INTERNAL MEDICINE PRE-OPERATIVE EVALUATION  [facility] OF [address] [place] [address]    NAME: Geetha Adame  AGE: 44 y o  SEX: male  : 1970     DATE: 2021     Internal Medicine Pre-Operative Evaluation:     Chief Complaint: Pre-operative Evaluation     Surgery: insertion of inflatable penile prosthesis  Anticipated Date of Surgery: to be determined  Referring Provider: Dorna Goltz MD      History of Present Illness:     Geetha Adame is a 44 y o  male who presents to the office today for a preoperative consultation at the request of surgeon, Dorna Goltz, who plans on performing insertion of inflatable penile prosthesis on to be determined  Planned anesthesia is general  Patient has a bleeding risk of: no recent abnormal bleeding  Patient does not have objections to receiving blood products if needed  Current anti-platelet/anti-coagulation medications that the patient is prescribed includes: none  Assessment of Chronic Conditions:   1  Type 2 diabetes mellitus with hyperglycemia, with long-term current use of insulin [facility])  Assessment & Plan:    Lab Results   Component Value Date    HGBA1C 7 4 (A) 2021     Continue to follow with endocrinology  Orders:  -     POCT hemoglobin A1c    2  Mixed hyperlipidemia due to type 2 diabetes mellitus [facility])  Assessment & Plan:    Lab Results   Component Value Date    HGBA1C 7 4 (A) 2021     The patient continues to follow with endocrinology  His hemoglobin A1c in the office today is 7 4  He does have an insulin pump  He states that his 1st morning sugars have been running in the 130s range  I did recommend the patient see his endocrinologist for preoperative clearance  3  Insulin long-term use (HCC)    4   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 [facility])  Assessment & Plan:    Lab Results   Component Value Date    HGBA1C 7 4 (A) 2021     The patient continues to follow closely with his eye doctor due to his retinopathy  I will contact his ophthalmologist to obtain those results for the chart  5  Essential hypertension  Assessment & Plan:    Patient's blood pressure in the office today is 124/78  He continues on his Coreg  Orders:  -     POCT ECG  -     Ambulatory referral to Cardiology; Future    6  Kidney transplant status, living related donor  Assessment & Plan:    Patient continues to follow with his transplant team in [place]  7  Obesity (BMI 30-39  9)  Assessment & Plan:    Patient's BMI in the office today is 35  Information was provided to the patient regarding healthy eating and exercise  8  Allergic rhinitis, unspecified seasonality, unspecified trigger  -     Ambulatory referral to Allergy; Future    9  Vitamin D deficiency  -     Vitamin D 25 hydroxy; Future    10  Pre-op evaluation  -     POCT ECG    11  Obesity, morbid (HCC)         Assessment of Cardiac Risk:  · Denies unstable or severe angina or MI in the last 6 weeks or history of stent placement in the last year   · Denies decompensated heart failure (e g  New onset heart failure, NYHA functional class IV heart failure, or worsening existing heart failure)  · Denies significant arrhythmias such as high grade AV block, symptomatic ventricular arrhythmia, newly recognized ventricular tachycardia, supraventricular tachycardia with resting heart rate >100, or symptomatic bradycardia  · Denies severe heart valve disease including aortic stenosis or symptomatic mitral stenosis     Exercise Capacity:  · Able to walk 4 blocks without symptoms?: Yes  · Able to walk 2 flights without symptoms?: Yes    Prior Anesthesia Reactions: No     Personal history of venous thromboembolic disease? No    History of steroid use for >2 weeks within last year? Yes      Review of Systems:     Review of Systems   Constitutional: Negative  Negative for fatigue  HENT: Negative    Negative for congestion, postnasal drip, rhinorrhea and trouble swallowing  Eyes: Negative  Negative for visual disturbance  Respiratory: Negative  Negative for choking and shortness of breath  Cardiovascular: Negative  Negative for chest pain  Gastrointestinal: Negative  Endocrine: Negative  Genitourinary: Negative  Musculoskeletal: Negative  Negative for arthralgias, back pain, myalgias and neck pain  Skin: Negative  Neurological: Negative for dizziness and headaches  Psychiatric/Behavioral: Negative  Problem List:     Patient Active Problem List   Diagnosis    Insulin long-term use ([place] [address] )    Kidney transplant status, living related donor    Mixed hyperlipidemia due to type 2 diabetes mellitus ([place] [address] )    Obesity (BMI 30-39  9)    Type 2 diabetes mellitus with hyperglycemia, with long-term current use of insulin ([place] [address] )    Hypertension    Renal disorder    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 ([place] [address] )    Vitamin D deficiency        Allergies:      Allergies   Allergen Reactions    Other Hives     Vinegar        Current Medications:       Current Outpatient Medications:     atorvastatin (LIPITOR) 80 mg tablet, Take 80 mg by mouth daily , Disp: , Rfl:     carvedilol (COREG) 12 5 mg tablet, Take 12 5 mg by mouth 2 (two) times a day with meals , Disp: , Rfl:     Cholecalciferol 50 MCG (2000 UT) CAPS, Take 1 capsule by mouth, Disp: , Rfl:     mycophenolic acid (MYFORTIC) 588 mg EC tablet, Take 720 mg by mouth 2 (two) times a day, Disp: , Rfl:     NovoLOG 100 UNIT/ML injection, FOR PUMP USE MAX DOSE  UNITS PER DAY, Disp: , Rfl:     ONE TOUCH ULTRA TEST test strip, use 1 TEST STRIP to TEST BLOOD SUGAR four times a day, Disp: , Rfl: 0    pantoprazole (PROTONIX) 40 mg tablet, Take 40 mg by mouth daily , Disp: , Rfl:     Pediatric Multivitamins-Fl (POLY-VI-VIKI) 0 25 MG/ML SUSP, Take 1 mL by mouth, Disp: , Rfl:     predniSONE 5 mg tablet, Take 5 mg by mouth daily , Disp: , Rfl:     tacrolimus (PROGRAF) 1 mg capsule, Take 2 mg by mouth every 12 (twelve) hours , Disp: , Rfl:      Past History:     Past Medical History:   Diagnosis Date    Chronic kidney disease     Diabetes 1 5, managed as type 2 ([facility] [address] )     [facility]     Hyperlipidemia     Hypertension     Obesity     Renal disorder     kidney transplant L side    Tear meniscus knee         Past Surgical History:   Procedure Laterality Date    APPENDECTOMY      BACK SURGERY      CATARACT EXTRACTION Right     EYE SURGERY      KNEE SURGERY      NEPHRECTOMY TRANSPLANTED ORGAN Left         Family History   Problem Relation Age of Onset    Diabetes Mother     Coronary artery disease Mother     Coronary artery disease Father         Social History     Socioeconomic History    Marital status: /Civil Union     Spouse name: Not on file    Number of children: Not on file    Years of education: Not on file    Highest education level: Not on file   Occupational History    Not on file   Social Needs    Financial resource strain: Not on file    Food insecurity     Worry: Not on file     Inability: Not on file   [employer] needs     Medical: Not on file     Non-medical: Not on file   Tobacco Use    Smoking status: Former Smoker     Years: 10 00     Types: Cigarettes     Quit date:      Years since quittin 2    Smokeless tobacco: Never Used    Tobacco comment: havent smoked in 5-6 years    Substance and Sexual Activity    Alcohol use:  Yes     Alcohol/week: 2 0 standard drinks     Types: 2 Shots of liquor per week     Frequency: 2-4 times a month     Comment: social    Drug use: Yes     Types: Marijuana    Sexual activity: Not on file   Lifestyle    Physical activity     Days per week: 2 days     Minutes per session: 20 min    Stress: Not at all   Relationships    Social connections     Talks on phone: Not on file     Gets together: Not on file     Attends Islam service: Not on file     Active member of club or organization: Not on file     Attends meetings of clubs or organizations: Not on file     Relationship status: Not on file    Intimate partner violence     Fear of current or ex partner: Not on file     Emotionally abused: Not on file     Physically abused: Not on file     Forced sexual activity: Not on file   Other Topics Concern    Not on file   Social History Narrative    Not on file        Physical Exam:      /78 (BP Location: Left arm, Patient Position: Sitting, Cuff Size: Standard)   Pulse 76   Temp 97 7 °F (36 5 °C) (Temporal) Comment: NO NSAIDS  Resp 15   Ht 5' 11" (1 803 m)   Wt 117 kg (258 lb)   SpO2 99%   BMI 35 98 kg/m²     Physical Exam  Vitals signs reviewed  Constitutional:       General: He is not in acute distress  Appearance: Normal appearance  He is well-developed  He is obese  HENT:      Head: Normocephalic and atraumatic  Right Ear: Tympanic membrane, ear canal and external ear normal  There is no impacted cerumen  Left Ear: Tympanic membrane, ear canal and external ear normal  There is no impacted cerumen  Nose: Nose normal       Mouth/Throat:      Mouth: Mucous membranes are moist       Pharynx: No oropharyngeal exudate  Eyes:      General:         Right eye: No discharge  Left eye: No discharge  Conjunctiva/sclera: Conjunctivae normal       Pupils: Pupils are equal, round, and reactive to light  Neck:      Musculoskeletal: Normal range of motion and neck supple  Thyroid: No thyromegaly  Vascular: No JVD  Trachea: No tracheal deviation  Cardiovascular:      Rate and Rhythm: Normal rate and regular rhythm  Heart sounds: Normal heart sounds  No murmur  Pulmonary:      Effort: Pulmonary effort is normal  No respiratory distress  Breath sounds: Normal breath sounds  No wheezing  Abdominal:      General: Bowel sounds are normal  There is no distension  Palpations: Abdomen is soft  There is no mass  Tenderness:  There is no abdominal tenderness  There is no guarding or rebound  Hernia: No hernia is present  Musculoskeletal: Normal range of motion  Lymphadenopathy:      Cervical: No cervical adenopathy  Skin:     General: Skin is warm and dry  Capillary Refill: Capillary refill takes less than 2 seconds  Neurological:      General: No focal deficit present  Mental Status: He is alert and oriented to person, place, and time  Psychiatric:         Mood and Affect: Mood normal          Behavior: Behavior normal          Thought Content: Thought content normal          Judgment: Judgment normal            Data:     Pre-operative work-up    Laboratory Results: lab work ordered  EKG: in office today, NSR, no previous for comparison  Records requested from previous cardiologist       Assessment:     1  Type 2 diabetes mellitus with hyperglycemia, with long-term current use of insulin ([facility])  POCT hemoglobin A1c   2  Mixed hyperlipidemia due to type 2 diabetes mellitus ([facility] )     3  Insulin long-term use ([facility])     4  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 ([facility] )     5  Essential hypertension  POCT ECG    Ambulatory referral to Cardiology    CANCELED: Ambulatory referral to Cardiology   6  Kidney transplant status, living related donor     7  Obesity (BMI 30-39 9)     8  Allergic rhinitis, unspecified seasonality, unspecified trigger  Ambulatory referral to Allergy   9  Vitamin D deficiency  Vitamin D 25 hydroxy   10  Pre-op evaluation  POCT ECG   11  [facility] morbid ([facility] )          Plan:     44 y o  male with planned surgery: insertion of inflatable penile prosthesis        Revised Cardiac Risk Index for Pre-Operative Risk from [employer] com  on 4/6/2021  ** All calculations should be rechecked by clinician prior to use **    RESULT SUMMARY:  1 points  Class II Risk    6 0 %  30-day risk of death, MI, or cardiac arrest    From [employer] 2017, based on pooled data from 5 high quality external validations (4 prospective)  These numbers are higher than those often quoted from the now-outdated original study ([facility] 7502)  See Evidence for details  INPUTS:  Elevated-risk surgery --> 0 = No  History of ischemic heart disease --> 0 = No  History of congestive heart failure --> 0 = No  History of cerebrovascular disease --> 0 = No  Pre-operative treatment with insulin --> 1 = Yes  Pre-operative creatinine >2 mg/dL / 176 8 µmol/L --> 0 = No    1  Further preoperative workup as follows:   Needs cardiac clearance-previous cardiac procedure, patient unsure of what type of procedure  Records requested from previous cardiologist    Needs endocrinology clearance-patient instructed to contact his endocrinologist to schedule appointment  Hemoglobin A1c in office today 7 4    2  Medication Management/Recommendations:   - Insulin Management: by endocrinology    3  Prophylaxis for cardiac events with perioperative beta-blockers: not indicated  4  Patient requires further consultation with: Cardiology and Endocrinology    Clearance  Patient is DENIED for surgery at this time  [unfilled] requires further work-up and consultation with specialists as outlined above       Brittany Chadwick, [facility]  [facility] [address]  Phone#  [phone]  Fax#  [phone]

## 2021-04-06 NOTE — PROGRESS NOTES
INTERNAL MEDICINE FOLLOW-UP OFFICE VISIT  [facility] - [facility]    NAME: Geetha Adame  AGE: 44 y o  SEX: male    DATE OF ENCOUNTER: 4/6/2021   Assessment and Plan:     {Assess/Plan SmartLinks:92158}    No follow-ups on file  Counseling:     · Medication Side Effects - Adverse side effects of medications were reviewed with the patient/guardian today: {YES/NO:30996}  · Counseling was given regarding: {AMB Counseling Topics:2349900833}  · Barriers to treatment include: {sl amb Barriers:47583}      Chief Complaint:     Chief Complaint   Patient presents with    Allergies     bad allergies        History of Present Illness:     HPI    The following portions of the patient's history were reviewed and updated as appropriate: allergies, current medications, past family history, past medical history, past social history, past surgical history and problem list      Review of Systems:     Review of Systems     Problem List:     Patient Active Problem List   Diagnosis    Insulin long-term use ([facility] )    Kidney transplant status, living related donor    Mixed hyperlipidemia due to type 2 diabetes mellitus ([facility] )    Obesity (BMI 30-39  9)    Type 2 diabetes mellitus with hyperglycemia, with long-term current use of insulin ([facility])    Hypertension    Renal disorder        Objective: There were no vitals taken for this visit      Physical Exam    Pertinent Laboratory/Diagnostic Studies:    Laboratory Results: {Labs reviewed:06987}  Radiology/Other Diagnostic Testing Results: {Results Review Statement:05592}     Current Medications:     Current Outpatient Medications   Medication Sig Dispense Refill    atorvastatin (LIPITOR) 80 mg tablet Take 80 mg by mouth daily       carvedilol (COREG) 12 5 mg tablet Take 12 5 mg by mouth 2 (two) times a day with meals       Cholecalciferol 50 MCG (2000 UT) CAPS Take 1 capsule by mouth      mycophenolic acid (MYFORTIC) 807 mg EC tablet Take 720 mg by mouth 2 (two) times a day      NovoLOG 100 UNIT/ML injection FOR PUMP USE MAX DOSE  UNITS PER DAY      ONE TOUCH ULTRA TEST test strip use 1 TEST STRIP to TEST BLOOD SUGAR four times a day  0    pantoprazole (PROTONIX) 40 mg tablet Take 40 mg by mouth daily       Pediatric Multivitamins-Fl (POLY-VI-VIKI) 0 25 MG/ML SUSP Take 1 mL by mouth      predniSONE 5 mg tablet Take 5 mg by mouth daily       ciprofloxacin (CILOXAN) 0 3 % ophthalmic solution       ketorolac (ACULAR) 0 5 % ophthalmic solution       ofloxacin (OCUFLOX) 0 3 % ophthalmic solution       prednisoLONE acetate (PRED FORTE) 1 % ophthalmic suspension       tacrolimus (PROGRAF) 1 mg capsule Take 2 mg by mouth every 12 (twelve) hours        No current facility-administered medications for this visit  There are no Patient Instructions on file for this visit      Severa Camera, CRNP  [facility] OF [facility]

## 2021-04-06 NOTE — PATIENT INSTRUCTIONS
Flonase nasal spray daily  Zyrtec or claritan daily  Surgery clearance by:   Cardiologist  Endocrinologist            Allergic Rhinitis   WHAT YOU NEED TO KNOW:   Allergic rhinitis, or hay fever, is swelling of the inside of your nose  The swelling is a reaction to allergens in the air  An allergen can be anything that causes an allergic reaction  Allergies to weeds, grass, trees, or mold often cause seasonal allergic rhinitis  Indoor dust mites, cockroaches, pet dander, or mold can also cause allergic rhinitis  DISCHARGE INSTRUCTIONS:   Call 911 for the following:   · You have chest pain or shortness of breath  Return to the emergency department if:   · You have severe pain  · You cough up blood  Contact your healthcare provider if:   · You have a fever  · You have ear or sinus pain, or a headache  · Your symptoms get worse, even after treatment  · You have yellow, green, brown, or bloody mucus coming from your nose  · Your nose is bleeding or you have pain inside your nose  · You have trouble sleeping because of your symptoms  · You have questions or concerns about your condition or care  Medicines:   · Medicines  help decrease your symptoms and clear your stuffy nose  · Take your medicine as directed  Contact your healthcare provider if you think your medicine is not helping or if you have side effects  Tell him of her if you are allergic to any medicine  Keep a list of the medicines, vitamins, and herbs you take  Include the amounts, and when and why you take them  Bring the list or the pill bottles to follow-up visits  Carry your medicine list with you in case of an emergency  How to manage allergic rhinitis:  The best way to manage allergic rhinitis is to avoid allergens that can trigger your symptoms  Any of the following may help decrease your symptoms:  · Rinse your nose and sinuses  with a salt water solution or use a salt water nasal spray   This will help thin the mucus in your nose and rinse away pollen and dirt  It will also help reduce swelling so you can breathe normally  Ask your healthcare provider how often to rinse your nose  · Reduce exposure to dust mites  Wash sheets and towels in hot water every week  Cover your pillows and mattresses with allergen-free covers  Limit the number of stuffed animals and soft toys your child has  Wash your child's toys in hot water regularly  Vacuum weekly and use a vacuum  with an air filter  If possible, get rid of carpets and curtains  These collect dust and dust mites  · Reduce exposure to pollen  Keep windows and doors closed in your house and car  Stay inside when air pollution or the pollen count is high  Run your air conditioner on recycle, and change air filters often  Shower and wash your hair before bed every night to rinse away pollen  · Reduce exposure to pet dander  If possible, do not keep cats, dogs, birds, or other pets  If you do keep pets in your home, keep them out of bedrooms and carpeted rooms  Bathe them often  · Reduce exposure to mold  Do not spend time in basements  Choose artificial plants instead of live plants  Keep your home's humidity at less than 45%  Do not have ponds or standing water in your home or yard  · Do not smoke  Avoid others who smoke  Ask your healthcare provider for information if you currently smoke and need help to quit  Follow up with your healthcare provider as directed: You may need to see an allergist often to control your symptoms  Write down your questions so you remember to ask them during your visits  © Copyright [address] Information is for End User's use only and may not be sold, redistributed or otherwise used for commercial purposes  All illustrations and images included in CareNotes® are the copyrighted property of [employer]  or [address] [place] [address] Tamara   The above information is an  only   It is not intended as medical advice for individual conditions or treatments  Talk to your doctor, nurse or pharmacist before following any medical regimen to see if it is safe and effective for you

## 2021-04-06 NOTE — ASSESSMENT & PLAN NOTE
Lab Results   Component Value Date    HGBA1C 7 4 (A) 04/06/2021     The patient continues to follow closely with his eye doctor due to his retinopathy  I will contact his ophthalmologist to obtain those results for the chart

## 2021-04-06 NOTE — ASSESSMENT & PLAN NOTE
Patient's BMI in the office today is 28  Information was provided to the patient regarding healthy eating and exercise

## 2021-04-06 NOTE — TELEPHONE ENCOUNTER
Patient is being referred by Marco Antonio Sheehan for Essential HTN for ASAP appointment w/Dr Mi Davis    Please advise if pt can be seen by any other provider

## 2021-04-14 ENCOUNTER — TELEPHONE (OUTPATIENT)
Dept: INTERNAL MEDICINE CLINIC | Facility: CLINIC | Age: 51
End: 2021-04-14

## 2021-05-17 ENCOUNTER — VBI (OUTPATIENT)
Dept: ADMINISTRATIVE | Facility: OTHER | Age: 51
End: 2021-05-17

## 2021-06-01 LAB — HBA1C MFR BLD HPLC: 7.9 %

## 2021-06-01 PROCEDURE — 3051F HG A1C>EQUAL 7.0%<8.0%: CPT | Performed by: INTERNAL MEDICINE

## 2021-06-04 ENCOUNTER — CONSULT (OUTPATIENT)
Dept: CARDIOLOGY CLINIC | Facility: CLINIC | Age: 51
End: 2021-06-04
Payer: COMMERCIAL

## 2021-06-04 VITALS
WEIGHT: 250 LBS | OXYGEN SATURATION: 100 % | HEART RATE: 81 BPM | DIASTOLIC BLOOD PRESSURE: 82 MMHG | BODY MASS INDEX: 35 KG/M2 | SYSTOLIC BLOOD PRESSURE: 134 MMHG | HEIGHT: 71 IN

## 2021-06-04 DIAGNOSIS — E78.2 MIXED HYPERLIPIDEMIA: ICD-10-CM

## 2021-06-04 DIAGNOSIS — E66.9 OBESITY (BMI 30-39.9): ICD-10-CM

## 2021-06-04 DIAGNOSIS — R06.02 SHORTNESS OF BREATH ON EXERTION: ICD-10-CM

## 2021-06-04 DIAGNOSIS — I10 ESSENTIAL HYPERTENSION: ICD-10-CM

## 2021-06-04 DIAGNOSIS — I25.10 CAD S/P PERCUTANEOUS CORONARY ANGIOPLASTY: ICD-10-CM

## 2021-06-04 DIAGNOSIS — Z94.0 KIDNEY TRANSPLANT STATUS, LIVING RELATED DONOR: ICD-10-CM

## 2021-06-04 DIAGNOSIS — Z01.810 PREOP CARDIOVASCULAR EXAM: Primary | ICD-10-CM

## 2021-06-04 DIAGNOSIS — Z98.61 CAD S/P PERCUTANEOUS CORONARY ANGIOPLASTY: ICD-10-CM

## 2021-06-04 PROCEDURE — 3079F DIAST BP 80-89 MM HG: CPT | Performed by: INTERNAL MEDICINE

## 2021-06-04 PROCEDURE — 99204 OFFICE O/P NEW MOD 45 MIN: CPT | Performed by: INTERNAL MEDICINE

## 2021-06-04 PROCEDURE — 3066F NEPHROPATHY DOC TX: CPT | Performed by: INTERNAL MEDICINE

## 2021-06-04 PROCEDURE — 3075F SYST BP GE 130 - 139MM HG: CPT | Performed by: INTERNAL MEDICINE

## 2021-06-04 PROCEDURE — 3008F BODY MASS INDEX DOCD: CPT | Performed by: INTERNAL MEDICINE

## 2021-06-04 PROCEDURE — 1036F TOBACCO NON-USER: CPT | Performed by: INTERNAL MEDICINE

## 2021-06-04 RX ORDER — ASPIRIN 81 MG/1
81 TABLET, CHEWABLE ORAL DAILY
Start: 2021-06-04

## 2021-06-04 NOTE — PROGRESS NOTES
[facility]  [address], [place], [facility] [place] Avendaño  Tel: [phone]      NAME: Price Kocher  AGE: 46 y o  SEX: male  : 1970   MRN: [id number]      Chief Complaint:  Chief Complaint   Patient presents with   [address]     ref by PCP    cardiac risk assessment         History of Present Illness:   Ref by JESSICA Rodriguez, for cardiac risk assessment prior to penile implant surgery  47-year-old obese male with CAD s/p PCI, hypertension, hyperlipidemia, S/P transplanted kidney who is here for cardiac risk assessment prior to penile implant surgery  He is not very active physically and does get short of breath with exertion  Pt denies chest pain, palpitations, lightheadedness, syncope, swelling feet, orthopnea, PND, claudication  He has not seen a cardiologist for the last few years and has had no recent cardiac testing  He is new to our practice  CAD s/p stent to LCx in 2016 -  Completed 2 yrs of DAPT  Currently on aspirin, statin, beta-blocker which he takes regularly  HTN -  Has been hypertensive for many years  Taking medications regularly  Denies lightheadedness, headache, medication side effects  HLP -  Has had hyperlipidemia for many years  Taking statin regularly along with diet control  Denies myalgia  PCP closely monitoring the blood work      Obesity    S/P living donor renal transplant (2016) - on immunosupressive therapy    DM - on insulin therapy      Past Medical History:  Past Medical History:   Diagnosis Date    Chronic kidney disease     Diabetes 1 5, managed as type 2 ([facility]     Diabetes mellitus ([facility]     Hyperlipidemia     Hypertension     Obesity     Renal disorder     kidney transplant L side    Tear meniscus knee          Past Surgical History:  Past Surgical History:   Procedure Laterality Date    APPENDECTOMY      BACK SURGERY      CATARACT EXTRACTION Right     EYE SURGERY      KNEE SURGERY      NEPHRECTOMY TRANSPLANTED ORGAN Left          Family History:  Family History   Problem Relation Age of Onset    Diabetes Mother     Coronary artery disease Mother     Hypertension Mother     Coronary artery disease Father     Heart disease Father          Social History:  Social History     Socioeconomic History    Marital status: /Civil Union     Spouse name: None    Number of children: None    Years of education: None    Highest education level: None   Occupational History    None   Social Needs    Financial resource strain: None    Food insecurity     Worry: None     Inability: None    Transportation needs     Medical: None     Non-medical: None   Tobacco Use    Smoking status: Former Smoker     Years: 10 00     Types: Cigarettes     Quit date:      Years since quittin 4    Smokeless tobacco: Never Used    Tobacco comment: havent smoked in 5-6 years    Substance and Sexual Activity    Alcohol use:  Yes     Alcohol/week: 2 0 standard drinks     Types: 2 Shots of liquor per week     Frequency: 2-4 times a month     Comment: social    Drug use: Yes     Types: Marijuana    Sexual activity: None   Lifestyle    Physical activity     Days per week: 2 days     Minutes per session: 20 min    Stress: Not at all   Relationships    Social connections     Talks on phone: None     Gets together: None     Attends Congregational service: None     Active member of club or organization: None     Attends meetings of clubs or organizations: None     Relationship status: None    Intimate partner violence     Fear of current or ex partner: None     Emotionally abused: None     Physically abused: None     Forced sexual activity: None   Other Topics Concern    None   Social History Narrative    None         Active Problems:  Patient Active Problem List   Diagnosis    [facility]    Kidney transplant status, living related donor    Mixed hyperlipidemia due to type 2 diabetes mellitus (HCC)    Obesity (BMI 30-39  9)    Type 2 diabetes mellitus with hyperglycemia, with long-term current use of insulin ([facility] )    Hypertension    Renal disorder    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 ([facility] )    Vitamin D deficiency    CAD S/P percutaneous coronary angioplasty    Mixed hyperlipidemia    Kidney transplanted         The following portions of the patient's history were reviewed and updated as appropriate: past medical history, past surgical history, past family history,  past social history, current medications, allergies and problem list       Review of Systems:  Constitutional: Denies fever, chills  Eyes: Denies eye redness, eye discharge  ENT: Denies hearing loss, tinnitus, sneezing, nasal discharge, sore throat   Respiratory: Denies cough, expectoration  +shortness of breath  Cardiovascular: Denies chest pain, palpitations, orthopnea, lower extremity swelling  Gastrointestinal: Denies abdominal pain, nausea, vomiting, hematemesis, diarrhea, bloody stools  Genito-Urinary: Denies dysuria, incontinence  Musculoskeletal: Denies back pain  Neurologic: Denies lightheadedness, syncope, headache, seizures  Endocrine: Denies polydipsia, temperature intolerance  Allergy and Immunology: Denies hives, insect bite sensitivity  Hematological and Lymphatic: Denies bleeding problems, swollen glands   Psychological: Denies depression, suicidal ideation, anxiety, panic  Dermatological: Denies pruritus, rash, skin lesion changes      Vitals:  Vitals:    06/04/21 0847   BP: 134/82   Pulse: 81   SpO2: 100%       Body mass index is 34 87 kg/m²  Weight (last 2 days)     Date/Time   Weight    06/04/21 0847   113 (250)                Physical Examination:  General: Patient is not in acute distress  Awake, alert, oriented in time, place and person  Responding to commands  Head: Normocephalic  Atraumatic  Eyes:  Both pupils normal sized, round and reactive to light  Nonicteric  ENT: Normal external ear canals  Neck: Supple  JVP not raised  Trachea central  No thyromegaly  Lungs: Bilateral bronchovascular breath sounds with no crackles or rhonchi  Chest wall: No tenderness  Cardiovascular: RRR  S1 and S2 normal  No murmur, rub or gallop  Gastrointestinal: Abdomen soft, nontender  No guarding or rigidity  Liver and spleen not palpable  Bowel sounds present  Neurologic: Patient is awake, alert, oriented in time, place and person  Responding to command  Moving all extremities  Integumentary:  No skin rash  Lymphatic: No cervical lymphadenopathy  Back: Symmetric  No CVA tenderness  Extremities: No clubbing, cyanosis or edema      Laboratory Results:  Lab Results   Component Value Date    HGBA1C 7 4 (A) 2021       [id number]  Reviewed by me  Sinus rhythm  LAD    Nonspecific ST-T changes      Medications:    Current Outpatient Medications:     atorvastatin (LIPITOR) 80 mg tablet, Take 80 mg by mouth daily , Disp: , Rfl:     carvedilol (COREG) 12 5 mg tablet, Take 12 5 mg by mouth 2 (two) times a day with meals , Disp: , Rfl:     Cholecalciferol 50 MCG (2000 UT) CAPS, Take 1 capsule by mouth, Disp: , Rfl:     mycophenolic acid (MYFORTIC) 066 mg EC tablet, Take 720 mg by mouth 2 (two) times a day, Disp: , Rfl:     NovoLOG 100 UNIT/ML injection, FOR PUMP USE MAX DOSE  UNITS PER DAY, Disp: , Rfl:     ONE TOUCH ULTRA TEST test strip, use 1 TEST STRIP to TEST BLOOD SUGAR four times a day, Disp: , Rfl: 0    pantoprazole (PROTONIX) 40 mg tablet, Take 40 mg by mouth daily , Disp: , Rfl:     Pediatric Multivitamins-Fl (POLY-VI-VIKI) 0 25 MG/ML SUSP, Take 1 mL by mouth, Disp: , Rfl:     predniSONE 5 mg tablet, Take 5 mg by mouth daily , Disp: , Rfl:     tacrolimus (PROGRAF) 1 mg capsule, Take 2 mg by mouth every 12 (twelve) hours , Disp: , Rfl:     aspirin 81 mg chewable tablet, Chew 1 tablet (81 mg total) daily, Disp:  , Rfl: Allergies: Allergies   Allergen Reactions    Other Hives     Vinegar         Assessment and Plan:  1  Preop cardiovascular exam  2  Shortness of breath on exertion   patient with known cardiac disease with no recent cardiology follow-up and no recent cardiac testing with dyspnea on exertion   Check 2D echocardiogram for EF, RWMA   check exercise nuclear stress test to rule out underlying coronary ischemia    - Echo complete with contrast if indicated; Future  - NM myocardial perfusion spect (stress and/or rest); Future    3  CAD S/P percutaneous coronary angioplasty   continue aspirin, statin, beta-blocker    4  Essential hypertension   BP stable  Continue current medications    5  Mixed hyperlipidemia   continue statin and diet control  His PCP closely monitors his blood work    6  Obesity (BMI 30-39 9)   counseled to try to lose weight    7  Kidney transplanted    Recommend aggressive risk factor modification and therapeutic lifestyle changes  Low-salt, low-calorie, low-fat, low-cholesterol diet with regular exercise and to optimize weight  I will defer the ordering and monitoring of necessity lab studies to you, but I am available and happy to review and manage any of the data at your request in the future  Discussed concepts of atherosclerosis, including signs and symptoms of cardiac disease  Previous studies were reviewed  Safety measures were reviewed  Questions were entertained and answered  Patient was advised to report any problems requiring medical attention  Follow-up with PCP and appropriate specialist and lab work as discussed  Return for follow up visit as scheduled or earlier, if needed  Thank you for allowing me to participate in the care and evaluation of your patient  Should you have any questions, please feel free to contact me        Melina Holguin MD  6/4/2021,9:24 AM     ADDENDUM 7/6/21 -  Cardiac catheterization report reviewed  Patient has no active cardiac conditions (such as unstable cardiac syndrome, decompensated heart failure, significant arrhythmias, severe valvular disease) and has CAD, DM but no other clinical risk factors (such as compensated or prior heart failure, renal insufficiency, CVA)  Patient is at least a moderate cardiac risk patient going in for a penile implant surgery  No further cardiac workup is needed at this time  No cardiac contraindications for surgery  Perioperative use of beta-blocker is highly recommended

## 2021-06-10 ENCOUNTER — HOSPITAL ENCOUNTER (OUTPATIENT)
Dept: NON INVASIVE DIAGNOSTICS | Facility: HOSPITAL | Age: 51
Discharge: HOME | End: 2021-06-10
Attending: INTERNAL MEDICINE
Payer: COMMERCIAL

## 2021-06-10 ENCOUNTER — TELEPHONE (OUTPATIENT)
Dept: CARDIOLOGY CLINIC | Facility: CLINIC | Age: 51
End: 2021-06-10

## 2021-06-10 DIAGNOSIS — Z98.61 CAD S/P PERCUTANEOUS CORONARY ANGIOPLASTY: ICD-10-CM

## 2021-06-10 DIAGNOSIS — R06.02 SHORTNESS OF BREATH ON EXERTION: ICD-10-CM

## 2021-06-10 DIAGNOSIS — Z01.810 PREOP CARDIOVASCULAR EXAM: ICD-10-CM

## 2021-06-10 DIAGNOSIS — I25.10 CAD S/P PERCUTANEOUS CORONARY ANGIOPLASTY: ICD-10-CM

## 2021-06-10 PROCEDURE — 93306 TTE W/DOPPLER COMPLETE: CPT

## 2021-06-10 PROCEDURE — 93306 TTE W/DOPPLER COMPLETE: CPT | Performed by: INTERNAL MEDICINE

## 2021-06-10 NOTE — TELEPHONE ENCOUNTER
I am not aware of any pts needing covid test before stress test, I tried to call stress test and no one answered  Also if I ordered today it would not be back in time  Did anyone hear of this being needed?    pls let pt know its not needed as far as I know, unless you hear otherwise

## 2021-06-10 NOTE — TELEPHONE ENCOUNTER
LM asking for patient to call back to further discuss  Have not come across or made aware COVID tests needed for testing

## 2021-06-10 NOTE — TELEPHONE ENCOUNTER
Pt called requesting a pcr covid test order as per the testing dept in order to get his echo test tomorrow

## 2021-06-11 ENCOUNTER — TELEPHONE (OUTPATIENT)
Dept: INTERNAL MEDICINE CLINIC | Facility: CLINIC | Age: 51
End: 2021-06-11

## 2021-06-11 ENCOUNTER — HOSPITAL ENCOUNTER (OUTPATIENT)
Dept: RADIOLOGY | Facility: HOSPITAL | Age: 51
Discharge: HOME | End: 2021-06-11
Payer: COMMERCIAL

## 2021-06-11 ENCOUNTER — HOSPITAL ENCOUNTER (OUTPATIENT)
Dept: NON INVASIVE DIAGNOSTICS | Facility: HOSPITAL | Age: 51
Discharge: HOME | End: 2021-06-11
Payer: COMMERCIAL

## 2021-06-11 DIAGNOSIS — I25.10 CAD S/P PERCUTANEOUS CORONARY ANGIOPLASTY: ICD-10-CM

## 2021-06-11 DIAGNOSIS — R06.02 SHORTNESS OF BREATH ON EXERTION: ICD-10-CM

## 2021-06-11 DIAGNOSIS — Z98.61 CAD S/P PERCUTANEOUS CORONARY ANGIOPLASTY: ICD-10-CM

## 2021-06-11 DIAGNOSIS — Z01.810 PREOP CARDIOVASCULAR EXAM: ICD-10-CM

## 2021-06-11 DIAGNOSIS — Z20.822 ENCOUNTER FOR LABORATORY TESTING FOR COVID-19 VIRUS: Primary | ICD-10-CM

## 2021-06-11 LAB
CHEST PAIN STATEMENT: NORMAL
MAX DIASTOLIC BP: 86 MMHG
MAX HEART RATE: 150 BPM
MAX PREDICTED HEART RATE: 167 BPM
MAX. SYSTOLIC BP: 198 MMHG
PROTOCOL NAME: NORMAL
TARGET HR FORMULA: NORMAL
TEST INDICATION: NORMAL
TIME IN EXERCISE PHASE: NORMAL

## 2021-06-11 PROCEDURE — 78452 HT MUSCLE IMAGE SPECT MULT: CPT

## 2021-06-11 PROCEDURE — 93018 CV STRESS TEST I&R ONLY: CPT

## 2021-06-11 PROCEDURE — 93016 CV STRESS TEST SUPVJ ONLY: CPT

## 2021-06-11 PROCEDURE — A9502 TC99M TETROFOSMIN: HCPCS

## 2021-06-11 PROCEDURE — G1004 CDSM NDSC: HCPCS

## 2021-06-11 PROCEDURE — 93017 CV STRESS TEST TRACING ONLY: CPT

## 2021-06-11 NOTE — TELEPHONE ENCOUNTER
Patient called back  He stated he was still waiting to hear back regarding order for COVID test   Spoke to Nhi  Asked patient if he called PCP and he stated he didn't but will try now to call him to get the order

## 2021-06-11 NOTE — TELEPHONE ENCOUNTER
S/w patient, he stated that [place] test is being required by the surgeon for surgery on June 16th  Will you place order for patient?

## 2021-06-11 NOTE — TELEPHONE ENCOUNTER
Pt is having surgery Wed 6/16  He didn't want to say what kind of surgery  Salter Pasquale and needs covid test done prior    can't be a rapid result    he can come today or Monday     Please call pt to let him know when to come  I spoke to [address] she will ck carlin/Jana once she is finished with her patient

## 2021-06-11 NOTE — TELEPHONE ENCOUNTER
Let the patient know that the test may not be back in time for the Wednesday surgery  An order can be placed for the patient to be swabbed at the urgent care in [place]  On Sunday  Let me know if he wants to proceed with the urgent care swabbing

## 2021-06-12 DIAGNOSIS — I25.10 CAD S/P PERCUTANEOUS CORONARY ANGIOPLASTY: Primary | ICD-10-CM

## 2021-06-12 DIAGNOSIS — Z98.61 CAD S/P PERCUTANEOUS CORONARY ANGIOPLASTY: Primary | ICD-10-CM

## 2021-06-12 DIAGNOSIS — Z20.822 ENCOUNTER FOR LABORATORY TESTING FOR COVID-19 VIRUS: ICD-10-CM

## 2021-06-12 PROCEDURE — U0003 INFECTIOUS AGENT DETECTION BY NUCLEIC ACID (DNA OR RNA); SEVERE ACUTE RESPIRATORY SYNDROME CORONAVIRUS 2 (SARS-COV-2) (CORONAVIRUS DISEASE [COVID-19]), AMPLIFIED PROBE TECHNIQUE, MAKING USE OF HIGH THROUGHPUT TECHNOLOGIES AS DESCRIBED BY CMS-2020-01-R: HCPCS | Performed by: FAMILY MEDICINE

## 2021-06-12 PROCEDURE — U0005 INFEC AGEN DETEC AMPLI PROBE: HCPCS | Performed by: FAMILY MEDICINE

## 2021-06-13 LAB — SARS-COV-2 RNA RESP QL NAA+PROBE: NEGATIVE

## 2021-06-14 ENCOUNTER — TELEPHONE (OUTPATIENT)
Dept: CARDIOLOGY CLINIC | Facility: CLINIC | Age: 51
End: 2021-06-14

## 2021-06-14 ENCOUNTER — TELEPHONE (OUTPATIENT)
Dept: INTERNAL MEDICINE CLINIC | Facility: CLINIC | Age: 51
End: 2021-06-14

## 2021-06-14 DIAGNOSIS — I25.10 CAD S/P PERCUTANEOUS CORONARY ANGIOPLASTY: Primary | ICD-10-CM

## 2021-06-14 DIAGNOSIS — Z98.61 CAD S/P PERCUTANEOUS CORONARY ANGIOPLASTY: Primary | ICD-10-CM

## 2021-06-14 NOTE — TELEPHONE ENCOUNTER
S/w pt, pre screening completed  Labs are in Epic, bed res sent to the [facility], this pt is to have a [facility] on 6/17/21  Can we check for auth please?  Thank you

## 2021-06-14 NOTE — TELEPHONE ENCOUNTER
----- Message from Ja Malin DO sent at 6/14/2021  8:12 AM EDT -----  Please notify pt of negative testing

## 2021-06-14 NOTE — TELEPHONE ENCOUNTER
----- Message from David Mclean MD sent at 6/12/2021 12:42 PM EDT -----   Patient needs cardiac catheterization for an abnormal stress test   Patient aware that he will be called to schedule the  catheterization

## 2021-06-15 NOTE — TELEPHONE ENCOUNTER
Patient called asking if tests ordered for pre op clearance will need to be repeated after cath since surgery is rescheduled for Aug 4th  Informed patient that test did not need to be repeated  Verbally understood

## 2021-06-16 ENCOUNTER — VBI (OUTPATIENT)
Dept: ADMINISTRATIVE | Facility: OTHER | Age: 51
End: 2021-06-16

## 2021-06-16 ENCOUNTER — APPOINTMENT (OUTPATIENT)
Dept: LAB | Facility: CLINIC | Age: 51
End: 2021-06-16
Payer: COMMERCIAL

## 2021-06-16 DIAGNOSIS — E78.2 MIXED HYPERLIPIDEMIA DUE TO TYPE 2 DIABETES MELLITUS (HCC): ICD-10-CM

## 2021-06-16 DIAGNOSIS — Z79.4 TYPE 2 DIABETES MELLITUS WITH HYPERGLYCEMIA, WITH LONG-TERM CURRENT USE OF INSULIN (HCC): ICD-10-CM

## 2021-06-16 DIAGNOSIS — Z12.5 PROSTATE CANCER SCREENING: ICD-10-CM

## 2021-06-16 DIAGNOSIS — E11.69 MIXED HYPERLIPIDEMIA DUE TO TYPE 2 DIABETES MELLITUS (HCC): ICD-10-CM

## 2021-06-16 DIAGNOSIS — I10 ESSENTIAL HYPERTENSION: ICD-10-CM

## 2021-06-16 DIAGNOSIS — E11.65 TYPE 2 DIABETES MELLITUS WITH HYPERGLYCEMIA, WITH LONG-TERM CURRENT USE OF INSULIN (HCC): ICD-10-CM

## 2021-06-16 LAB
ALBUMIN SERPL BCP-MCNC: 3.7 G/DL (ref 3.5–5)
ALP SERPL-CCNC: 66 U/L (ref 46–116)
ALT SERPL W P-5'-P-CCNC: 37 U/L (ref 12–78)
ANION GAP SERPL CALCULATED.3IONS-SCNC: 9 MMOL/L (ref 4–13)
AST SERPL W P-5'-P-CCNC: 19 U/L (ref 5–45)
BASOPHILS # BLD AUTO: 0.04 THOUSANDS/ΜL (ref 0–0.1)
BASOPHILS NFR BLD AUTO: 1 % (ref 0–1)
BILIRUB SERPL-MCNC: 0.79 MG/DL (ref 0.2–1)
BUN SERPL-MCNC: 15 MG/DL (ref 5–25)
CALCIUM SERPL-MCNC: 9.1 MG/DL (ref 8.3–10.1)
CHLORIDE SERPL-SCNC: 105 MMOL/L (ref 100–108)
CHOLEST SERPL-MCNC: 130 MG/DL (ref 50–200)
CO2 SERPL-SCNC: 25 MMOL/L (ref 21–32)
CREAT SERPL-MCNC: 1.35 MG/DL (ref 0.6–1.3)
CREAT UR-MCNC: 292 MG/DL
EOSINOPHIL # BLD AUTO: 0.12 THOUSAND/ΜL (ref 0–0.61)
EOSINOPHIL NFR BLD AUTO: 2 % (ref 0–6)
ERYTHROCYTE [DISTWIDTH] IN BLOOD BY AUTOMATED COUNT: 13.2 % (ref 11.6–15.1)
GFR SERPL CREATININE-BSD FRML MDRD: 69 ML/MIN/1.73SQ M
GLUCOSE P FAST SERPL-MCNC: 238 MG/DL (ref 65–99)
HCT VFR BLD AUTO: 47 % (ref 36.5–49.3)
HDLC SERPL-MCNC: 45 MG/DL
HGB BLD-MCNC: 15.1 G/DL (ref 12–17)
IMM GRANULOCYTES # BLD AUTO: 0.06 THOUSAND/UL (ref 0–0.2)
IMM GRANULOCYTES NFR BLD AUTO: 1 % (ref 0–2)
INR PPP: 0.96 (ref 0.84–1.19)
LDLC SERPL CALC-MCNC: 32 MG/DL (ref 0–100)
LYMPHOCYTES # BLD AUTO: 1.07 THOUSANDS/ΜL (ref 0.6–4.47)
LYMPHOCYTES NFR BLD AUTO: 16 % (ref 14–44)
MCH RBC QN AUTO: 31.5 PG (ref 26.8–34.3)
MCHC RBC AUTO-ENTMCNC: 32.1 G/DL (ref 31.4–37.4)
MCV RBC AUTO: 98 FL (ref 82–98)
MICROALBUMIN UR-MCNC: 664 MG/L (ref 0–20)
MICROALBUMIN/CREAT 24H UR: 227 MG/G CREATININE (ref 0–30)
MONOCYTES # BLD AUTO: 0.77 THOUSAND/ΜL (ref 0.17–1.22)
MONOCYTES NFR BLD AUTO: 11 % (ref 4–12)
NEUTROPHILS # BLD AUTO: 4.71 THOUSANDS/ΜL (ref 1.85–7.62)
NEUTS SEG NFR BLD AUTO: 69 % (ref 43–75)
NRBC BLD AUTO-RTO: 0 /100 WBCS
PLATELET # BLD AUTO: 173 THOUSANDS/UL (ref 149–390)
PMV BLD AUTO: 12.3 FL (ref 8.9–12.7)
POTASSIUM SERPL-SCNC: 4 MMOL/L (ref 3.5–5.3)
PROT SERPL-MCNC: 7.3 G/DL (ref 6.4–8.2)
PROTHROMBIN TIME: 12.8 SECONDS (ref 11.6–14.5)
PSA SERPL-MCNC: 1 NG/ML (ref 0–4)
RBC # BLD AUTO: 4.79 MILLION/UL (ref 3.88–5.62)
SODIUM SERPL-SCNC: 139 MMOL/L (ref 136–145)
TRIGL SERPL-MCNC: 266 MG/DL
WBC # BLD AUTO: 6.77 THOUSAND/UL (ref 4.31–10.16)

## 2021-06-16 PROCEDURE — 85610 PROTHROMBIN TIME: CPT

## 2021-06-16 PROCEDURE — 80061 LIPID PANEL: CPT

## 2021-06-16 PROCEDURE — 3060F POS MICROALBUMINURIA REV: CPT | Performed by: INTERNAL MEDICINE

## 2021-06-16 PROCEDURE — 85025 COMPLETE CBC W/AUTO DIFF WBC: CPT

## 2021-06-16 PROCEDURE — 80053 COMPREHEN METABOLIC PANEL: CPT

## 2021-06-16 PROCEDURE — G0103 PSA SCREENING: HCPCS

## 2021-06-16 PROCEDURE — 36415 COLL VENOUS BLD VENIPUNCTURE: CPT

## 2021-06-16 PROCEDURE — 82043 UR ALBUMIN QUANTITATIVE: CPT

## 2021-06-16 PROCEDURE — 82570 ASSAY OF URINE CREATININE: CPT

## 2021-06-16 NOTE — TELEPHONE ENCOUNTER
06/16/21 12:26 PM     [facility]

## 2021-06-18 NOTE — TELEPHONE ENCOUNTER
This went to peer to peer review    Phone Number to schedule is [phone]  option 4    Case# [id number]

## 2021-06-18 NOTE — TELEPHONE ENCOUNTER
S/w pt, updating him that his procedure requires a peer to peer review and we will keep him posted on the outcome  Verbally understood

## 2021-06-21 NOTE — TELEPHONE ENCOUNTER
S/w patient and verbally understood auth received  Patient would like for you to review BW results  Patient also would like BW sent to Nephrologist, Dr Jazmin Alcantar at [facility]  Faxed and confirmation received

## 2021-06-25 ENCOUNTER — TELEPHONE (OUTPATIENT)
Dept: SURGERY | Facility: HOSPITAL | Age: 51
End: 2021-06-25

## 2021-06-28 ENCOUNTER — HOSPITAL ENCOUNTER (OUTPATIENT)
Dept: INTERVENTIONAL RADIOLOGY/VASCULAR | Facility: HOSPITAL | Age: 51
Discharge: HOME | End: 2021-06-28
Attending: INTERNAL MEDICINE | Admitting: INTERNAL MEDICINE
Payer: COMMERCIAL

## 2021-06-28 VITALS
RESPIRATION RATE: 20 BRPM | WEIGHT: 250 LBS | DIASTOLIC BLOOD PRESSURE: 92 MMHG | SYSTOLIC BLOOD PRESSURE: 194 MMHG | HEIGHT: 71 IN | OXYGEN SATURATION: 98 % | HEART RATE: 80 BPM | TEMPERATURE: 99.3 F | BODY MASS INDEX: 35 KG/M2

## 2021-06-28 DIAGNOSIS — I25.10 CAD S/P PERCUTANEOUS CORONARY ANGIOPLASTY: ICD-10-CM

## 2021-06-28 DIAGNOSIS — Z98.61 CAD S/P PERCUTANEOUS CORONARY ANGIOPLASTY: ICD-10-CM

## 2021-06-28 LAB
ATRIAL RATE: 81 BPM
GLUCOSE SERPL-MCNC: 188 MG/DL (ref 65–140)
P AXIS: 58 DEGREES
PR INTERVAL: 144 MS
QRS AXIS: -46 DEGREES
QRSD INTERVAL: 98 MS
QT INTERVAL: 398 MS
QTC INTERVAL: 462 MS
T WAVE AXIS: 90 DEGREES
VENTRICULAR RATE: 81 BPM

## 2021-06-28 PROCEDURE — 93010 ELECTROCARDIOGRAM REPORT: CPT | Performed by: INTERNAL MEDICINE

## 2021-06-28 PROCEDURE — 93458 L HRT ARTERY/VENTRICLE ANGIO: CPT | Performed by: INTERNAL MEDICINE

## 2021-06-28 PROCEDURE — 93005 ELECTROCARDIOGRAM TRACING: CPT

## 2021-06-28 PROCEDURE — 99152 MOD SED SAME PHYS/QHP 5/>YRS: CPT | Performed by: INTERNAL MEDICINE

## 2021-06-28 PROCEDURE — C1769 GUIDE WIRE: HCPCS | Performed by: INTERNAL MEDICINE

## 2021-06-28 PROCEDURE — 99153 MOD SED SAME PHYS/QHP EA: CPT | Performed by: INTERNAL MEDICINE

## 2021-06-28 PROCEDURE — 82948 REAGENT STRIP/BLOOD GLUCOSE: CPT

## 2021-06-28 PROCEDURE — C1887 CATHETER, GUIDING: HCPCS | Performed by: INTERNAL MEDICINE

## 2021-06-28 PROCEDURE — C1894 INTRO/SHEATH, NON-LASER: HCPCS | Performed by: INTERNAL MEDICINE

## 2021-06-28 RX ORDER — NITROGLYCERIN 20 MG/100ML
INJECTION INTRAVENOUS CODE/TRAUMA/SEDATION MEDICATION
Status: COMPLETED | OUTPATIENT
Start: 2021-06-28 | End: 2021-06-28

## 2021-06-28 RX ORDER — LIDOCAINE WITH 8.4% SOD BICARB 0.9%(10ML)
SYRINGE (ML) INJECTION CODE/TRAUMA/SEDATION MEDICATION
Status: COMPLETED | OUTPATIENT
Start: 2021-06-28 | End: 2021-06-28

## 2021-06-28 RX ORDER — FENTANYL CITRATE 50 UG/ML
INJECTION, SOLUTION INTRAMUSCULAR; INTRAVENOUS CODE/TRAUMA/SEDATION MEDICATION
Status: COMPLETED | OUTPATIENT
Start: 2021-06-28 | End: 2021-06-28

## 2021-06-28 RX ORDER — HEPARIN SODIUM 1000 [USP'U]/ML
INJECTION, SOLUTION INTRAVENOUS; SUBCUTANEOUS CODE/TRAUMA/SEDATION MEDICATION
Status: COMPLETED | OUTPATIENT
Start: 2021-06-28 | End: 2021-06-28

## 2021-06-28 RX ORDER — MIDAZOLAM HYDROCHLORIDE 2 MG/2ML
INJECTION, SOLUTION INTRAMUSCULAR; INTRAVENOUS CODE/TRAUMA/SEDATION MEDICATION
Status: COMPLETED | OUTPATIENT
Start: 2021-06-28 | End: 2021-06-28

## 2021-06-28 RX ADMIN — IODIXANOL 50 ML: 320 INJECTION, SOLUTION INTRAVASCULAR at 08:56

## 2021-06-28 RX ADMIN — Medication 2 ML: at 08:31

## 2021-06-28 RX ADMIN — HEPARIN SODIUM 5000 UNITS: 1000 INJECTION INTRAVENOUS; SUBCUTANEOUS at 08:37

## 2021-06-28 RX ADMIN — NITROGLYCERIN 200 MCG: 20 INJECTION INTRAVENOUS at 08:50

## 2021-06-28 RX ADMIN — FENTANYL CITRATE 50 MCG: 50 INJECTION, SOLUTION INTRAMUSCULAR; INTRAVENOUS at 08:30

## 2021-06-28 RX ADMIN — NITROGLYCERIN 200 MCG: 20 INJECTION INTRAVENOUS at 08:37

## 2021-06-28 RX ADMIN — FENTANYL CITRATE 25 MCG: 50 INJECTION, SOLUTION INTRAMUSCULAR; INTRAVENOUS at 08:36

## 2021-06-28 RX ADMIN — MIDAZOLAM HYDROCHLORIDE 1 MG: 1 INJECTION, SOLUTION INTRAMUSCULAR; INTRAVENOUS at 08:30

## 2021-06-28 NOTE — DISCHARGE INSTRUCTIONS
After Radial Heart Catheterization   WHAT YOU NEED TO KNOW:   What will happen after a radial heart catheterization? · You will be attached to a heart monitor until you are fully awake  A heart monitor is an EKG that stays on continuously to record your heart's electrical activity  Healthcare providers will monitor your vital signs and pulses in your arm  They will frequently check your pressure bandage for bleeding or swelling  · You may have a band wrapped tightly around your wrist  The band puts pressure on your wound and helps prevent bleeding  A healthcare provider can put air into the band or remove air from the band  A healthcare provider will gradually remove air from the band and decrease pressure on your wrist  The band may be removed in 2 hours or when your wound stops bleeding  · You will need to keep your wrist straight for 2 to 4 hours  Do not  push or pull with your arm  Arm movements can cause serious bleeding  After you are monitored for several hours, you may go home or may need to stay in the hospital overnight  What do I need to know before I go home? · Care for your wound as directed  Remove the pressure bandage in 24 hours or as directed  Mild bruising is normal and expected  A small bandage can be placed on your wound after you remove the pressure bandage  Do not put powders, lotions, or creams on your wound  They may cause your wound to get infected  Monitor your wound every day for signs of infection, such as redness, swelling, or pus  · Shower the day after your procedure or as directed  Remove your pressure bandage before you shower  Do not take baths or go in hot tubs or pools  Carefully wash the wound with soap and water  Pat the area dry  A small bandage can be placed on your wound after you shower  · Apply firm, steady pressure to your wound if it bleeds  Apply pressure with a clean gauze or towel for 5 to 10 minutes   Call 911 if bleeding becomes heavy or does not stop  · Drink liquids as directed  Liquids will help flush the contrast liquid from your body  Ask how much liquid to drink each day and which liquids are best for you  · Do not lift anything heavier than 5 pounds until directed by your healthcare provider  Heavy lifting can put stress on your wound and cause bleeding  Do not push or pull with the arm that was used for the procedure  Do not do vigorous activity for at least 48 hours  Vigorous activity may cause bleeding from your wound  Rest and do quiet activities  Take short walks around the house to prevent a blood clot  Ask your healthcare provider when you can return to your normal activities  · Do not drive or return to work until your healthcare provider says it is okay  Your healthcare provider may tell you to wait 48 hours before you drive to decrease your risk for bleeding  You may not be able to return to work for at least 2 days after your procedure if your job involves heavy lifting  What medicines may I need? You may need any of the following:  · Blood thinners  help prevent blood clots  Clots can cause strokes, heart attacks, and death  The following are general safety guidelines to follow while you are taking a blood thinner:    ? Watch for bleeding and bruising while you take blood thinners  Watch for bleeding from your gums or nose  Watch for blood in your urine and bowel movements  Use a soft washcloth on your skin, and a soft toothbrush to brush your teeth  This can keep your skin and gums from bleeding  If you shave, use an electric shaver  Do not play contact sports  ? Tell your dentist and other healthcare providers that you take a blood thinner  Wear a bracelet or necklace that says you take this medicine  ? Do not start or stop any other medicines unless your healthcare provider tells you to  Many medicines cannot be used with blood thinners      ? Take your blood thinner exactly as prescribed by your healthcare provider  Do not skip does or take less than prescribed  Tell your provider right away if you forget to take your blood thinner, or if you take too much  ? Warfarin  is a blood thinner that you may need to take  The following are things you should be aware of if you take warfarin:     § Foods and medicines can affect the amount of warfarin in your blood  Do not make major changes to your diet while you take warfarin  Warfarin works best when you eat about the same amount of vitamin K every day  Vitamin K is found in green leafy vegetables and certain other foods  Ask for more information about what to eat when you are taking warfarin  § You will need to see your healthcare provider for follow-up visits when you are on warfarin  You will need regular blood tests  These tests are used to decide how much medicine you need  · Acetaminophen  helps decrease pain and fever  This medicine is available without a doctor's order  Ask how much medicine is safe to take, and how often to take it  Acetaminophen can cause liver damage if not taken correctly  · Take your medicine as directed  Contact your healthcare provider if you think your medicine is not helping or if you have side effects  Tell him or her if you are allergic to any medicine  Keep a list of the medicines, vitamins, and herbs you take  Include the amounts, and when and why you take them  Bring the list or the pill bottles to follow-up visits  Carry your medicine list with you in case of an emergency  Call your local emergency number (911 in the [address] [facility]) if:   · You have chest pain  · You have any of the following signs of a heart attack:      ? Squeezing, pressure, or pain in your chest    ? You may  also have any of the following:     ? Discomfort or pain in your back, neck, jaw, stomach, or arm    ? Shortness of breath    ? Nausea or vomiting    ?  Lightheadedness or a sudden cold sweat    · You have any of the following signs of a stroke:      ? Numbness or drooping on one side of your face     ? Weakness in an arm or leg    ? Confusion or difficulty speaking    ? Dizziness, a severe headache, or vision loss    · You cough up blood  · You have trouble breathing  · You cannot stop the bleeding from your wound even after you hold firm pressure for 10 minutes  When should I call my doctor? · You have a fever or chills  · Blood soaks through your bandage  · Your stitches come apart  · Your hand or arm feels numb, cool, or looks pale  · Your wound gets swollen quickly  · Your wound is red, swollen, or draining pus  · Your wound looks more bruised or you have new bruising on the side of your wrist      · You have nausea or are vomiting  · Your skin is itchy, swollen, or you have a rash  · You have questions or concerns about your condition or care  Healthy living tips: The following are general healthy guidelines  If your chest pain is caused by a heart problem, your healthcare provider will give you specific guidelines to follow  · Manage other health conditions  Diabetes and high cholesterol increases your risk for another heart attack and stroke  Talk to your healthcare provider about your management plan  He or she will make a plan that helps you manage your conditions  · Do not smoke  Nicotine and other chemicals in cigarettes and cigars can cause lung and heart damage  Ask your healthcare provider for information if you currently smoke and need help to quit  E-cigarettes or smokeless tobacco still contain nicotine  Talk to your healthcare provider before you use these products  · Eat a variety of healthy, low-fat, low-salt foods  Healthy foods include fruits, vegetables, whole-grain breads, low-fat dairy products, beans, lean meats, and fish  Ask for more information about a heart healthy diet  · Drink plenty of water every day  Your body is made of mostly water   Water helps your body to control your temperature and blood pressure  Ask your healthcare provider how much water you should drink every day  · Ask about activity  Your healthcare provider will tell you which activities to limit or avoid  Ask when you can drive, return to work, and have sex  Ask about the best exercise plan for you  · Maintain a healthy weight  Ask your healthcare provider how much you should weigh  Ask him or her to help you create a weight loss plan if you are overweight  · Get the flu and pneumonia vaccines  All adults should get the influenza (flu) vaccine  Get it every year as soon as it becomes available  The pneumococcal vaccine is given to adults aged 72 years or older  The vaccine is given every 5 years to prevent pneumococcal disease, such as pneumonia  If you have a stent:   · Carry your stent card with you at all times  · Let all healthcare providers know that you have a stent  CARE AGREEMENT:   You have the right to help plan your care  Learn about your health condition and how it may be treated  Discuss treatment options with your healthcare providers to decide what care you want to receive  You always have the right to refuse treatment  The above information is an  only  It is not intended as medical advice for individual conditions or treatments  Talk to your doctor, nurse or pharmacist before following any medical regimen to see if it is safe and effective for you  © Copyright [address] Information is for End User's use only and may not be sold, redistributed or otherwise used for commercial purposes   All illustrations and images included in CareNotes® are the copyrighted property of [employer]  or [address] [place], [place] [address]

## 2021-06-28 NOTE — BRIEF OP NOTE (RAD/CATH)
The patient is a year old hypertension most recent stress test  for preoperative evaluation  Procedure performed:   1  Right radial artery access  2  Selective coronary angiography   3  Left heart catheterization  Right radial artery was accessed under local lidocaine anesthesia with ultrasound guidance  Six English sheath was placed and removed with a TR band  This procedure was accomplished without complication  Medications administered:  1  Local lidocaine anesthesia  2  Conscious sedation was administered multiple stent% fentanyl throughout the procedure  Selective coronary angiography  A  The right coronary artery was found to be non dominant and totally occluded with right to right collateralization  B  Left main coronary artery short free of significant disease  C  Left anterior descending coronary artery extends around the apex supplies several diagonal branches and is free of critical stenosis  There was a 30% narrowing noted in proximal LAD  D  Left circumflex coronary artery in the AV groove supplies several obtuse marginal branches and a PDA  There is luminal irregularities much 30% narrowing noted left circumflex in the AV groove after the 1st obtuse marginal branch  The PDA is totally occluded within a previously placed stent  There is left-to-left collateralization noted  The chronic total occlusion is approximately 1 5 cm in length  This is a small moderate size vessel  Left heart catheterization  Baseline systolic blood pressure was 170/90 mm mercury  There is no aortic valve gradient within end-diastolic pressure noted to be 12 mmHg  Conclusions:  1  Chronic total occlusion of the PDA InStent restenoses  2  Mild disease involving the left circumflex and left anterior descending coronary artery-please see above  3  Moderate systolic hypertension with normal resting left heart hemodynamics    Recommendations:   1  Continue aggressive risk factor modification  2  With chronic total occlusion and a relatively small distribution in a patient who is asymptomatic I believe continue medical management at this time is most appropriate  This is especially true with pending surgery  If the rate to address the total occlusion he will require dual antiplatelet therapy and increase the risk of bleeding perioperatively  I discussed these issues with Dr Bernabe Closs his primary cardiologist    3  Results of procedure were discussed with the patient

## 2021-06-29 ENCOUNTER — APPOINTMENT (OUTPATIENT)
Dept: LAB | Facility: CLINIC | Age: 51
End: 2021-06-29
Payer: COMMERCIAL

## 2021-06-29 DIAGNOSIS — Z98.890 HX OF CARDIAC CATHETERIZATION: ICD-10-CM

## 2021-06-29 LAB
ANION GAP SERPL CALCULATED.3IONS-SCNC: 5 MMOL/L (ref 4–13)
BUN SERPL-MCNC: 14 MG/DL (ref 5–25)
CALCIUM SERPL-MCNC: 8.6 MG/DL (ref 8.3–10.1)
CHLORIDE SERPL-SCNC: 108 MMOL/L (ref 100–108)
CO2 SERPL-SCNC: 25 MMOL/L (ref 21–32)
CREAT SERPL-MCNC: 1.23 MG/DL (ref 0.6–1.3)
GFR SERPL CREATININE-BSD FRML MDRD: 77 ML/MIN/1.73SQ M
GLUCOSE P FAST SERPL-MCNC: 193 MG/DL (ref 65–99)
POTASSIUM SERPL-SCNC: 3.5 MMOL/L (ref 3.5–5.3)
SODIUM SERPL-SCNC: 138 MMOL/L (ref 136–145)

## 2021-06-29 PROCEDURE — 36415 COLL VENOUS BLD VENIPUNCTURE: CPT

## 2021-06-29 PROCEDURE — 80048 BASIC METABOLIC PNL TOTAL CA: CPT

## 2021-07-01 ENCOUNTER — TELEPHONE (OUTPATIENT)
Dept: CARDIOLOGY CLINIC | Facility: CLINIC | Age: 51
End: 2021-07-01

## 2021-07-06 NOTE — TELEPHONE ENCOUNTER
Spoke with patient and informed him that Dr Shivam Casey updated the office visit not to include cardiac clearance  Patient will call back with fax number to send report to surgeon

## 2021-07-27 ENCOUNTER — TELEPHONE (OUTPATIENT)
Dept: INTERNAL MEDICINE CLINIC | Facility: CLINIC | Age: 51
End: 2021-07-27

## 2021-07-27 DIAGNOSIS — Z01.812 ENCOUNTER FOR PREOPERATIVE SCREENING LABORATORY TESTING FOR COVID-19 VIRUS: Primary | ICD-10-CM

## 2021-07-27 DIAGNOSIS — Z20.822 ENCOUNTER FOR PREOPERATIVE SCREENING LABORATORY TESTING FOR COVID-19 VIRUS: Primary | ICD-10-CM

## 2021-07-27 NOTE — TELEPHONE ENCOUNTER
Pt would like a COVID test done before his surgery on Aug 4  Jacki Gamble has to put an order in for the COVID swab  Also keep in mind that the test results don't come back right away       He would be doing it outside our office at [facility]    Please call him when he can get the COVID test done

## 2021-08-06 ENCOUNTER — VBI (OUTPATIENT)
Dept: ADMINISTRATIVE | Facility: OTHER | Age: 51
End: 2021-08-06

## 2021-08-11 ENCOUNTER — OFFICE VISIT (OUTPATIENT)
Dept: INTERNAL MEDICINE CLINIC | Facility: CLINIC | Age: 51
End: 2021-08-11
Payer: COMMERCIAL

## 2021-08-11 VITALS
OXYGEN SATURATION: 99 % | HEART RATE: 85 BPM | HEIGHT: 71 IN | SYSTOLIC BLOOD PRESSURE: 118 MMHG | BODY MASS INDEX: 34.05 KG/M2 | DIASTOLIC BLOOD PRESSURE: 78 MMHG | TEMPERATURE: 97.6 F | WEIGHT: 243.2 LBS | RESPIRATION RATE: 16 BRPM

## 2021-08-11 DIAGNOSIS — Z98.61 CAD S/P PERCUTANEOUS CORONARY ANGIOPLASTY: ICD-10-CM

## 2021-08-11 DIAGNOSIS — E66.9 OBESITY (BMI 30-39.9): ICD-10-CM

## 2021-08-11 DIAGNOSIS — Z12.12 SCREENING FOR COLORECTAL CANCER: ICD-10-CM

## 2021-08-11 DIAGNOSIS — Z79.4 TYPE 2 DIABETES MELLITUS WITH HYPERGLYCEMIA, WITH LONG-TERM CURRENT USE OF INSULIN (HCC): Primary | ICD-10-CM

## 2021-08-11 DIAGNOSIS — I25.10 CAD S/P PERCUTANEOUS CORONARY ANGIOPLASTY: ICD-10-CM

## 2021-08-11 DIAGNOSIS — I10 ESSENTIAL HYPERTENSION: ICD-10-CM

## 2021-08-11 DIAGNOSIS — E11.69 MIXED HYPERLIPIDEMIA DUE TO TYPE 2 DIABETES MELLITUS (HCC): ICD-10-CM

## 2021-08-11 DIAGNOSIS — Z11.59 NEED FOR HEPATITIS C SCREENING TEST: ICD-10-CM

## 2021-08-11 DIAGNOSIS — Z11.4 SCREENING FOR HIV (HUMAN IMMUNODEFICIENCY VIRUS): ICD-10-CM

## 2021-08-11 DIAGNOSIS — Z12.11 COLON CANCER SCREENING: ICD-10-CM

## 2021-08-11 DIAGNOSIS — E11.65 TYPE 2 DIABETES MELLITUS WITH HYPERGLYCEMIA, WITH LONG-TERM CURRENT USE OF INSULIN (HCC): Primary | ICD-10-CM

## 2021-08-11 DIAGNOSIS — Z12.11 SCREENING FOR COLORECTAL CANCER: ICD-10-CM

## 2021-08-11 DIAGNOSIS — E78.2 MIXED HYPERLIPIDEMIA DUE TO TYPE 2 DIABETES MELLITUS (HCC): ICD-10-CM

## 2021-08-11 DIAGNOSIS — E55.9 VITAMIN D DEFICIENCY: ICD-10-CM

## 2021-08-11 DIAGNOSIS — Z79.4 INSULIN LONG-TERM USE (HCC): ICD-10-CM

## 2021-08-11 DIAGNOSIS — Z23 ENCOUNTER FOR IMMUNIZATION: ICD-10-CM

## 2021-08-11 DIAGNOSIS — E13.319 RETINOPATHY DUE TO SECONDARY DIABETES MELLITUS (HCC): ICD-10-CM

## 2021-08-11 DIAGNOSIS — N52.9 IMPOTENCE: ICD-10-CM

## 2021-08-11 DIAGNOSIS — E78.2 MIXED HYPERLIPIDEMIA: ICD-10-CM

## 2021-08-11 PROCEDURE — 99214 OFFICE O/P EST MOD 30 MIN: CPT | Performed by: NURSE PRACTITIONER

## 2021-08-11 PROCEDURE — 3008F BODY MASS INDEX DOCD: CPT | Performed by: NURSE PRACTITIONER

## 2021-08-11 PROCEDURE — 1036F TOBACCO NON-USER: CPT | Performed by: NURSE PRACTITIONER

## 2021-08-11 PROCEDURE — 3074F SYST BP LT 130 MM HG: CPT | Performed by: NURSE PRACTITIONER

## 2021-08-11 PROCEDURE — 92250 FUNDUS PHOTOGRAPHY W/I&R: CPT | Performed by: NURSE PRACTITIONER

## 2021-08-11 PROCEDURE — 3078F DIAST BP <80 MM HG: CPT | Performed by: NURSE PRACTITIONER

## 2021-08-11 PROCEDURE — 3725F SCREEN DEPRESSION PERFORMED: CPT | Performed by: NURSE PRACTITIONER

## 2021-08-11 RX ORDER — GLIPIZIDE 2.5 MG/1
TABLET, EXTENDED RELEASE ORAL
COMMUNITY
Start: 2021-08-01

## 2021-08-11 RX ORDER — SILDENAFIL 100 MG/1
TABLET, FILM COATED ORAL
COMMUNITY
Start: 2021-06-14

## 2021-08-11 RX ORDER — NIFEDIPINE 60 MG/1
60 TABLET, FILM COATED, EXTENDED RELEASE ORAL DAILY
COMMUNITY
Start: 2021-08-06 | End: 2021-08-12 | Stop reason: SDUPTHER

## 2021-08-11 RX ORDER — SULFAMETHOXAZOLE AND TRIMETHOPRIM 800; 160 MG/1; MG/1
TABLET ORAL
COMMUNITY
Start: 2021-07-01

## 2021-08-11 RX ORDER — OXYCODONE HYDROCHLORIDE 5 MG/1
5 TABLET ORAL
COMMUNITY
Start: 2021-08-06

## 2021-08-11 NOTE — PATIENT INSTRUCTIONS
Make appointment with endocrinology, Gerald Magana  Make appointment with Cardiology ASAP, Dr Nelson Ruiz    10% - bad control"> 10% - bad control,Hemoglobin A1c (HbA1c) greater than 10% indicating poor diabetic control,Haemoglobin A1c greater than 10% indicating poor diabetic control">   Diabetes Mellitus Type 2 in Adults, Ambulatory Care   GENERAL INFORMATION:   Diabetes mellitus type 2  is a disease that affects how your body uses glucose (sugar)  Insulin helps move sugar out of the blood so it can be used for energy  Normally, when the blood sugar level increases, the pancreas makes more insulin  Type 2 diabetes develops because either the body cannot make enough insulin, or it cannot use the insulin correctly  After many years, your pancreas may stop making insulin  Common symptoms include the following:   · More hunger or thirst than usual     · Frequent urination     · Weight loss without trying     · Blurred vision  Seek immediate care for the following symptoms:   · Severe abdominal pain, or pain that spreads to your back  You may also be vomiting  · Trouble staying awake or focusing    · Shaking or sweating    · Blurred or double vision    · Breath has a fruity, sweet smell    · Breathing is deep and labored, or rapid and shallow    · Heartbeat is fast and weak  Treatment for diabetes mellitus type 2  includes keeping your blood sugar at a normal level  You must eat the right foods, and exercise regularly  You may also need medicine if you cannot control your blood sugar level with nutrition and exercise  Manage diabetes mellitus type 2:   · Check your blood sugar level  You will be taught how to check a small drop of blood in a glucose monitor  Ask your healthcare provider when and how often to check during the day  Ask your healthcare provider what your blood sugar levels should be when you check them  · Keep track of carbohydrates (sugar and starchy foods)    Your blood sugar level can get too high if you eat too many carbohydrates  Your dietitian will help you plan meals and snacks that have the right amount of carbohydrates  · Eat low-fat foods  Some examples are skinless chicken and low-fat milk  · Eat less sodium (salt)  Some examples of high-sodium foods to limit are soy sauce, potato chips, and soup  Do not add salt to food you cook  Limit your use of table salt  · Eat high-fiber foods  Foods that are a good source of fiber include vegetables, whole grain bread, and beans  · Limit alcohol  Alcohol affects your blood sugar level and can make it harder to manage your diabetes  Women should limit alcohol to 1 drink a day  Men should limit alcohol to 2 drinks a day  A drink of alcohol is 12 ounces of beer, 5 ounces of wine, or 1½ ounces of liquor  · Get regular exercise  Exercise can help keep your blood sugar level steady, decrease your risk of heart disease, and help you lose weight  Exercise for at least 30 minutes, 5 days a week  Include muscle strengthening activities 2 days each week  Work with your healthcare provider to create an exercise plan  · Check your feet each day  for injuries or open sores  Ask your healthcare provider for activities you can do if you have an open sore  · Quit smoking  If you smoke, it is never too late to quit  Smoking can worsen the problems that may occur with diabetes  Ask your healthcare provider for information about how to stop smoking if you are having trouble quitting  · Ask about your weight:  Ask healthcare providers if you need to lose weight, and how much to lose  Ask them to help you with a weight loss program  Even a 10 to 15 pound weight loss can help you manage your blood sugar level  · Carry medical alert identification  Wear medical alert jewelry or carry a card that says you have diabetes  Ask your healthcare provider where to get these items  · Ask about vaccines    Diabetes puts you at risk of serious illness if you get the flu, pneumonia, or hepatitis  Ask your healthcare provider if you should get a flu, pneumonia, or hepatitis B vaccine, and when to get the vaccine  Follow up with your healthcare provider as directed:  Write down your questions so you remember to ask them during your visits  CARE AGREEMENT:   You have the right to help plan your care  Learn about your health condition and how it may be treated  Discuss treatment options with your caregivers to decide what care you want to receive  You always have the right to refuse treatment  The above information is an  only  It is not intended as medical advice for individual conditions or treatments  Talk to your doctor, nurse or pharmacist before following any medical regimen to see if it is safe and effective for you  © 2014 [address] is for End User's use only and may not be sold, redistributed or otherwise used for commercial purposes  All illustrations and images included in CareNotes® are the copyrighted property of [employer]  or Kaiden Enriquez

## 2021-08-11 NOTE — ASSESSMENT & PLAN NOTE
The patient had surgery last week in [place] for placement of a penile implant  Currently he has been prescribed oxycodone 5 mg every 4-6 hours if needed for pain  He states that he has been taking 3 at a time without relief  In addition he states he did speak with his surgeon who said that he should contact his PCP for further management  I did recommend to the patient that he contact his surgeon to manage his postoperative pain  I also asked that he contact them to see what other measures they recommend such as ice to the area or other non narcotic medications

## 2021-08-11 NOTE — PROGRESS NOTES
Assessment and Plan:     Problem List Items Addressed This Visit     None           Preventive health issues were discussed with patient, and age appropriate screening tests were ordered as noted in patient's After Visit Summary  Personalized health advice and appropriate referrals for health education or preventive services given if needed, as noted in patient's After Visit Summary  History of Present Illness:     Patient presents for Medicare Annual Wellness visit    Patient Care Team:  Estephanie Leo as PCP - General (Internal Medicine)  JESSICA Banks (Nurse Practitioner)  Maria D Gallagher  (Ophthalmology)  Faith Souza MD (Nephrology)  Una Guardado MD (Urology)     Problem List:     Patient Active Problem List   Diagnosis    Insulin long-term use [facility])    Kidney transplant status, living related donor    Mixed hyperlipidemia due to type 2 diabetes mellitus ([address] )    Obesity (BMI 30-39  9)    Type 2 diabetes mellitus with hyperglycemia, with long-term current use of insulin ([address] )    Hypertension    Renal disorder    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 ([address] )    Vitamin D deficiency    CAD S/P percutaneous coronary angioplasty    Mixed hyperlipidemia    Kidney transplanted      Past Medical and Surgical History:     Past Medical History:   Diagnosis Date    Chronic kidney disease     Diabetes 1 5, managed as type 2 ([address] )     Diabetes mellitus ([address] )     Hyperlipidemia     Hypertension     Obesity     Renal disorder     kidney transplant L side    Tear meniscus knee      Past Surgical History:   Procedure Laterality Date    APPENDECTOMY      BACK SURGERY      CATARACT EXTRACTION Right dec2020    EYE SURGERY      KNEE SURGERY      NEPHRECTOMY TRANSPLANTED ORGAN Left       Family History:     Family History   Problem Relation Age of Onset    Diabetes Mother     Coronary artery disease Mother     Hypertension Mother     Coronary artery disease Father     Heart disease Father       Social History:     Social History     Socioeconomic History    Marital status: /Civil Union     Spouse name: None    Number of children: None    Years of education: None    Highest education level: None   Occupational History    None   Tobacco Use    Smoking status: Former Smoker     Years: 10 00     Types: Cigarettes     Quit date:      Years since quittin 6    Smokeless tobacco: Never Used    Tobacco comment: havent smoked in 5-6 years    Vaping Use    Vaping Use: Never used   Substance and Sexual Activity    Alcohol use: Yes     Alcohol/week: 2 0 standard drinks     Types: 2 Shots of liquor per week     Comment: social    Drug use: Yes     Frequency: 2 0 times per week     Types: Marijuana    Sexual activity: None   Other Topics Concern    None   Social History Narrative    None     Social Determinants of Health     Financial Resource Strain:     Difficulty of Paying Living Expenses:    Food Insecurity:     Worried About Running Out of Food in the Last Year:     Ran Out of Food in the Last Year:    Transportation Needs:     Lack of Transportation (Medical):      Lack of Transportation (Non-Medical):    Physical Activity: Insufficiently Active    Days of Exercise per Week: 2 days    Minutes of Exercise per Session: 20 min   Stress: No Stress Concern Present    Feeling of Stress : Not at all   Social Connections:     Frequency of Communication with Friends and Family:     Frequency of Social Gatherings with Friends and Family:     Attends Islam Services:     Active Member of Clubs or Organizations:     Attends Club or Organization Meetings:     Marital Status:    Intimate Partner Violence:     Fear of Current or Ex-Partner:     Emotionally Abused:     Physically Abused:     Sexually Abused:       Medications and Allergies:     Current Outpatient Medications   Medication Sig Dispense Refill    aspirin 81 mg chewable tablet Chew 1 tablet (81 mg total) daily      atorvastatin (LIPITOR) 80 mg tablet Take 80 mg by mouth daily       carvedilol (COREG) 12 5 mg tablet Take 12 5 mg by mouth 2 (two) times a day with meals       Cholecalciferol 50 MCG (2000 UT) CAPS Take 1 capsule by mouth      glipiZIDE (GLUCOTROL XL) 2 5 mg 24 hr tablet       mycophenolic acid (MYFORTIC) 206 mg EC tablet Take 720 mg by mouth 2 (two) times a day      NIFEdipine ER (ADALAT CC) 60 MG 24 hr tablet Take 60 mg by mouth daily      NovoLOG 100 UNIT/ML injection FOR PUMP USE MAX DOSE  UNITS PER DAY      ONE TOUCH ULTRA TEST test strip use 1 TEST STRIP to TEST BLOOD SUGAR four times a day  0    oxyCODONE (ROXICODONE) 5 mg immediate release tablet Take 5 mg by mouth every 4 to 6 hours if needed for pain      pantoprazole (PROTONIX) 40 mg tablet Take 40 mg by mouth daily       Pediatric Multivitamins-Fl (POLY-VI-VIKI) 0 25 MG/ML SUSP Take 1 mL by mouth      predniSONE 5 mg tablet Take 5 mg by mouth daily       sildenafil (VIAGRA) 100 mg tablet       sulfamethoxazole-trimethoprim (BACTRIM DS) 800-160 mg per tablet       tacrolimus (PROGRAF) 1 mg capsule Take 2 mg by mouth every 12 (twelve) hours        No current facility-administered medications for this visit  Allergies   Allergen Reactions    Other Hives     Vinegar      Immunizations:     Immunization History   Administered Date(s) Administered    INFLUENZA 10/21/2020    Influenza, seasonal, injectable 10/21/2020    SARS-CoV-2 / COVID-19 mRNA IM (Marcellus Quiñones) 03/18/2021, 04/15/2021      Health Maintenance:         Topic Date Due    Hepatitis C Screening  Never done    HIV Screening  Never done    Colorectal Cancer Screening  Never done         Topic Date Due    DTaP,Tdap,and Td Vaccines (1 - Tdap) Never done    Influenza Vaccine (1) 09/01/2021      Medicare Health Risk Assessment:     There were no vitals taken for this visit  Cortney Aguilar is here for his Initial Wellness visit       Health Risk Assessment: Patient rates overall health as good  Patient feels that their physical health rating is same  Patient is satisfied with their life  Eyesight was rated as slightly worse  Hearing was rated as same  Patient feels that their emotional and mental health rating is same  Patients states they are sometimes angry  Patient states they are sometimes unusually tired/fatigued  Pain experienced in the last 7 days has been a lot  Patient's pain rating has been 8/10  Patient states that he has experienced no weight loss or gain in last 6 months  Depression Screening:   PHQ-2 Score: 0      Fall Risk Screening: In the past year, patient has experienced: no history of falling in past year      Home Safety:  Patient does not have trouble with stairs inside or outside of their home  Patient has working smoke alarms and has working carbon monoxide detector  Home safety hazards include: none  Nutrition:   Current diet is Regular  Medications:   Patient is currently taking over-the-counter supplements  OTC medications include: see medication list  Patient is able to manage medications  Activities of Daily Living (ADLs)/Instrumental Activities of Daily Living (IADLs):   Walk and transfer into and out of bed and chair?: Yes  Dress and groom yourself?: Yes    Bathe or shower yourself?: Yes    Feed yourself?  Yes  Do your laundry/housekeeping?: Yes  Manage your money, pay your bills and track your expenses?: Yes  Make your own meals?: Yes    Do your own shopping?: Yes    Previous Hospitalizations:   Any hospitalizations or ED visits within the last 12 months?: Yes    How many hospitalizations have you had in the last year?: 1-2    Advance Care Planning:   Living will: No      PREVENTIVE SCREENINGS      Cardiovascular Screening:    General: Screening Not Indicated and History Lipid Disorder      Diabetes Screening:     General: Screening Not Indicated and History Diabetes      Prostate Cancer Screening:    General: Screening Current      Abdominal Aortic Aneurysm (AAA) Screening:    Risk factors include: tobacco use        Lung Cancer Screening:     General: Screening Not Indicated    Screening, Brief Intervention, and Referral to Treatment (SBIRT)    Screening  Typical number of drinks in a day: 0  Typical number of drinks in a week: 8  Interpretation: Low risk drinking behavior      AUDIT-C Screenin) How often did you have a drink containing alcohol in the past year? never  2) How many drinks did you have on a typical day when you were drinking in the past year? 0  3) How often did you have 6 or more drinks on one occasion in the past year? never    AUDIT-C Score: 0  Interpretation: Score 0-3 (male): Negative screen for alcohol misuse    Single Item Drug Screening:  How often have you used an illegal drug (including marijuana) or a prescription medication for non-medical reasons in the past year? never    Single Item Drug Screen Score: 0  Interpretation: Negative screen for possible drug use disorder    Review of Current Opioid Use  Opioid Risk Tool (ORT) Score: 0  Opioid Risk Tool (ORT) Interpretation: Score 0-3: Low risk for opioid misuse      JESSICA Velasquez

## 2021-08-11 NOTE — PROGRESS NOTES
Annabellemobarney    NAME: Sonia Marquez  AGE: 46 y o  SEX: male  : 1970     DATE: 2021     Assessment and Plan:     Problem List Items Addressed This Visit        Endocrine    Mixed hyperlipidemia due to type 2 diabetes mellitus ([facility] )    Relevant Medications    glipiZIDE (GLUCOTROL XL) 2 5 mg 24 hr tablet    Type 2 diabetes mellitus with hyperglycemia, with long-term current use of insulin ([facility] ) - Primary       Lab Results   Component Value Date    HGBA1C 7 9 2021     The patient continues to be followed by endocrinology  His last hemoglobin A1c was elevated at 7 9  I did encourage the patient to contact Endocrinology to make an appointment for continued follow-up and management  Relevant Medications    glipiZIDE (GLUCOTROL XL) 2 5 mg 24 hr tablet    Other Relevant Orders    IRIS Diabetic eye exam    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 (HCC)    Relevant Medications    glipiZIDE (GLUCOTROL XL) 2 5 mg 24 hr tablet       Cardiovascular and Mediastinum    Hypertension       Patient's blood pressure in the office today is 118/78  He was recently hospitalized for a penile implant surgery and was hospitalized 3 days after surgery for elevated blood pressures  He currently is taking Coreg and nifedipine  He is being followed by Cardiology  He is overdue for follow-up with them  I did recommend the patient contact Cardiology to make an appointment  Relevant Medications    NIFEdipine ER (ADALAT CC) 60 MG 24 hr tablet    CAD S/P percutaneous coronary angioplasty       Patient had a cardiac catheterization prior to his penile implant surgery which showed a chronic total occlusion of the PDA in stent restenosis along with mild disease  It was recommended the patient be on dual platelet therapy and he was to follow with cardiology to discuss  He did not make this appointment    I did recommend to the patient that he contact Cardiology to make an appointment to discuss these results in full as soon as possible  6/28/2021  Conclusions:  1  Chronic total occlusion of the PDA InStent restenoses  2  Mild disease involving the left circumflex and left anterior descending coronary artery-please see above  3  Moderate systolic hypertension with normal resting left heart hemodynamics     Recommendations:   1  Continue aggressive risk factor modification  2  With chronic total occlusion and a relatively small distribution in a patient who is asymptomatic I believe continue medical management at this time is most appropriate  This is especially true with pending surgery  If the rate to address the total occlusion he will require dual antiplatelet therapy and increase the risk of bleeding perioperatively  I discussed these issues with Dr Carlos Lazaro his primary cardiologist    3  Results of procedure were discussed with the patient  Relevant Medications    NIFEdipine ER (ADALAT CC) 60 MG 24 hr tablet    sildenafil (VIAGRA) 100 mg tablet       Other    Insulin long-term use (HCC)    Obesity (BMI 30-39  9)    Vitamin D deficiency    Mixed hyperlipidemia    Impotence       The patient had surgery last week in [place] for placement of a penile implant  Currently he has been prescribed oxycodone 5 mg every 4-6 hours if needed for pain  He states that he has been taking 3 at a time without relief  In addition he states he did speak with his surgeon who said that he should contact his PCP for further management  I did recommend to the patient that he contact his surgeon to manage his postoperative pain  I also asked that he contact them to see what other measures they recommend such as ice to the area or other non narcotic medications             Other Visit Diagnoses     Need for hepatitis C screening test        Screening for HIV (human immunodeficiency virus)        Encounter for immunization        Screening for colorectal cancer        Colon cancer screening        Relevant Orders    Cologuard          BMI Counseling: Body mass index is 33 92 kg/m²  The BMI is above normal  Nutrition recommendations include decreasing portion sizes, decreasing fast food intake, consuming healthier snacks, limiting drinks that contain sugar, moderation in carbohydrate intake, increasing intake of lean protein, reducing intake of saturated and trans fat and reducing intake of cholesterol  Exercise recommendations include exercising 3-5 times per week  Return for marcelino Martins  Chief Complaint:     Chief Complaint   Patient presents with    Follow-up     from ED for High Blood Pressure        History of Present Illness:     Juan Manuel Rock to the office today for follow-up  He was recently hospitalized for surgery and was noted to have postoperative hypertension  This surgeries in [place]  He was started on nifedipine in addition to his Coreg  His blood pressure in the office today is within normal limits  I did recommend to the patient that he contact cardiology make an appointment as soon as possible for both further management of his blood pressure as well as to review his recent cardiac cath and need for dual platelet therapy  The patient has also been instructed to contact Endocrinology to make an overdue appointment  And iris eye exam was performed in the office today  Cologuard testing has been ordered  I will see him back in 4 months  Review of Systems:     Review of Systems   Constitutional: Negative  Negative for fatigue  HENT: Negative  Negative for congestion, postnasal drip, rhinorrhea and trouble swallowing  Eyes: Negative  Negative for visual disturbance  Respiratory: Negative  Negative for choking and shortness of breath  Cardiovascular: Negative  Negative for chest pain  Gastrointestinal: Negative  Endocrine: Negative      Genitourinary: Positive for penile pain (recent penile implant surgery)  Musculoskeletal: Negative  Negative for arthralgias, back pain, myalgias and neck pain  Skin: Negative  Neurological: Negative for dizziness and headaches  Psychiatric/Behavioral: Negative  Problem List:     Patient Active Problem List   Diagnosis    Insulin long-term use ([place] [address] )    Kidney transplant status, living related donor    Mixed hyperlipidemia due to type 2 diabetes mellitus ([place] [address] )    Obesity (BMI 30-39  9)    Type 2 diabetes mellitus with hyperglycemia, with long-term current use of insulin ([facility])    Hypertension    Renal disorder    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 ([place] [address] )    Vitamin D deficiency    CAD S/P percutaneous coronary angioplasty    Mixed hyperlipidemia    Kidney transplanted    Impotence        Objective:     /78 (BP Location: Left arm, Patient Position: Sitting, Cuff Size: Standard)   Pulse 85   Temp 97 6 °F (36 4 °C) (Temporal) Comment: NO NSAIDS  Resp 16   Ht 5' 11" (1 803 m)   Wt 110 kg (243 lb 3 2 oz)   SpO2 99%   BMI 33 92 kg/m²     Current Outpatient Medications   Medication Instructions    aspirin 81 mg, Oral, Daily    atorvastatin (LIPITOR) 80 mg, Oral, Daily    carvedilol (COREG) 12 5 mg, Oral, 2 times daily with meals    Cholecalciferol 50 MCG (2000 UT) CAPS 1 capsule, Oral    glipiZIDE (GLUCOTROL XL) 2 5 mg 24 hr tablet No dose, route, or frequency recorded      mycophenolic acid (MYFORTIC) 286 mg, Oral, 2 times daily    NIFEdipine ER (ADALAT CC) 60 mg, Oral, Daily    NovoLOG 100 UNIT/ML injection FOR PUMP USE MAX DOSE  UNITS PER DAY    ONE TOUCH ULTRA TEST test strip use 1 TEST STRIP to TEST BLOOD SUGAR four times a day    oxyCODONE (ROXICODONE) 5 mg, Oral, every 4 to 6 hours if needed for pain    pantoprazole (PROTONIX) 40 mg, Oral, Daily    Pediatric Multivitamins-Fl (POLY-VI-VIKI) 0 25 MG/ML SUSP 1 mL, Oral    predniSONE 5 mg, Oral, Daily    sildenafil (VIAGRA) 100 mg tablet No dose, route, or frequency recorded   sulfamethoxazole-trimethoprim (BACTRIM DS) 800-160 mg per tablet No dose, route, or frequency recorded   tacrolimus (PROGRAF) 2 mg, Oral, Every 12 hours scheduled         Physical Exam  Vitals reviewed  Constitutional:       Appearance: Normal appearance  He is obese  HENT:      Head: Normocephalic and atraumatic  Nose: Nose normal       Mouth/Throat:      Mouth: Mucous membranes are moist    Eyes:      Extraocular Movements: Extraocular movements intact  Pupils: Pupils are equal, round, and reactive to light  Cardiovascular:      Rate and Rhythm: Normal rate and regular rhythm  Pulses: Normal pulses  Heart sounds: Normal heart sounds  No murmur heard  Pulmonary:      Effort: Pulmonary effort is normal       Breath sounds: Normal breath sounds  Musculoskeletal:         General: Normal range of motion  Skin:     General: Skin is warm  Neurological:      General: No focal deficit present  Mental Status: He is alert and oriented to person, place, and time  Psychiatric:         Mood and Affect: Mood is anxious  Behavior: Behavior normal          Thought Content:  Thought content normal          Judgment: Judgment normal          [address]

## 2021-08-11 NOTE — ASSESSMENT & PLAN NOTE
Lab Results   Component Value Date    HGBA1C 7 9 06/01/2021     The patient continues to be followed by endocrinology  His last hemoglobin A1c was elevated at 7 9  I did encourage the patient to contact Endocrinology to make an appointment for continued follow-up and management

## 2021-08-11 NOTE — ASSESSMENT & PLAN NOTE
Patient's blood pressure in the office today is 118/78  He was recently hospitalized for a penile implant surgery and was hospitalized 3 days after surgery for elevated blood pressures  He currently is taking Coreg and nifedipine  He is being followed by Cardiology  He is overdue for follow-up with them  I did recommend the patient contact Cardiology to make an appointment

## 2021-08-11 NOTE — ASSESSMENT & PLAN NOTE
Patient had a cardiac catheterization prior to his penile implant surgery which showed a chronic total occlusion of the PDA in stent restenosis along with mild disease  It was recommended the patient be on dual platelet therapy and he was to follow with cardiology to discuss  He did not make this appointment  I did recommend to the patient that he contact Cardiology to make an appointment to discuss these results in full as soon as possible  6/28/2021  Conclusions:  1  Chronic total occlusion of the PDA InStent restenoses  2  Mild disease involving the left circumflex and left anterior descending coronary artery-please see above  3  Moderate systolic hypertension with normal resting left heart hemodynamics     Recommendations:   1  Continue aggressive risk factor modification  2  With chronic total occlusion and a relatively small distribution in a patient who is asymptomatic I believe continue medical management at this time is most appropriate  This is especially true with pending surgery  If the rate to address the total occlusion he will require dual antiplatelet therapy and increase the risk of bleeding perioperatively  I discussed these issues with Dr Zohreh Suggs his primary cardiologist    3  Results of procedure were discussed with the patient

## 2021-08-12 ENCOUNTER — TELEPHONE (OUTPATIENT)
Dept: INTERNAL MEDICINE CLINIC | Facility: CLINIC | Age: 51
End: 2021-08-12

## 2021-08-12 LAB
LEFT EYE DIABETIC RETINOPATHY: ABNORMAL
LEFT EYE IMAGE QUALITY: ABNORMAL
LEFT EYE MACULAR EDEMA: ABNORMAL
LEFT EYE OTHER RETINOPATHY: ABNORMAL
RIGHT EYE DIABETIC RETINOPATHY: ABNORMAL
RIGHT EYE IMAGE QUALITY: ABNORMAL
RIGHT EYE MACULAR EDEMA: ABNORMAL
RIGHT EYE OTHER RETINOPATHY: ABNORMAL
SEVERITY (EYE EXAM): ABNORMAL

## 2021-08-12 PROCEDURE — 2024F 7 FLD RTA PHOTO EVC RTNOPTHY: CPT | Performed by: NURSE PRACTITIONER

## 2021-09-14 NOTE — TELEPHONE ENCOUNTER
Pt called stating he had IPP 8/4 [address] he was to have post op today which he was unable to make and is rescheduled 9/16,he's asking if he needs to be seen by physician who performed procedure or can he follow here closer to home,I informed him I would forward his request to office

## 2021-11-01 ENCOUNTER — RA CDI HCC (OUTPATIENT)
Dept: OTHER | Facility: HOSPITAL | Age: 51
End: 2021-11-01

## 2021-11-03 ENCOUNTER — IMMUNIZATIONS (OUTPATIENT)
Dept: FAMILY MEDICINE CLINIC | Facility: HOSPITAL | Age: 51
End: 2021-11-03

## 2021-11-03 DIAGNOSIS — Z23 ENCOUNTER FOR IMMUNIZATION: Primary | ICD-10-CM

## 2021-11-03 PROCEDURE — 91301 COVID-19 MODERNA VACC 0.5 ML: CPT

## 2021-11-03 PROCEDURE — 0011A COVID-19 MODERNA VACC 0.5 ML: CPT

## 2021-11-04 ENCOUNTER — VBI (OUTPATIENT)
Dept: ADMINISTRATIVE | Facility: OTHER | Age: 51
End: 2021-11-04

## 2021-12-10 ENCOUNTER — VBI (OUTPATIENT)
Dept: ADMINISTRATIVE | Facility: OTHER | Age: 51
End: 2021-12-10

## 2022-02-22 DIAGNOSIS — I10 ESSENTIAL HYPERTENSION: ICD-10-CM

## 2022-02-22 RX ORDER — NIFEDIPINE 60 MG/1
60 TABLET, FILM COATED, EXTENDED RELEASE ORAL DAILY
Qty: 90 TABLET | Refills: 3 | Status: SHIPPED | OUTPATIENT
Start: 2022-02-22

## 2022-12-16 ENCOUNTER — VBI (OUTPATIENT)
Dept: ADMINISTRATIVE | Facility: OTHER | Age: 52
End: 2022-12-16

## 2023-01-31 ENCOUNTER — VBI (OUTPATIENT)
Dept: ADMINISTRATIVE | Facility: OTHER | Age: 53
End: 2023-01-31

## 2023-04-06 DIAGNOSIS — I10 ESSENTIAL HYPERTENSION: ICD-10-CM

## 2023-04-06 RX ORDER — NIFEDIPINE 60 MG/1
TABLET, FILM COATED, EXTENDED RELEASE ORAL
Qty: 90 TABLET | Refills: 3 | Status: SHIPPED | OUTPATIENT
Start: 2023-04-06

## 2023-05-01 DIAGNOSIS — E11.65 TYPE 2 DIABETES MELLITUS WITH HYPERGLYCEMIA, WITH LONG-TERM CURRENT USE OF INSULIN (HCC): Primary | ICD-10-CM

## 2023-05-01 DIAGNOSIS — Z79.4 TYPE 2 DIABETES MELLITUS WITH HYPERGLYCEMIA, WITH LONG-TERM CURRENT USE OF INSULIN (HCC): Primary | ICD-10-CM

## 2023-05-15 ENCOUNTER — TELEPHONE (OUTPATIENT)
Dept: FAMILY MEDICINE CLINIC | Facility: CLINIC | Age: 53
End: 2023-05-15

## 2023-05-15 NOTE — TELEPHONE ENCOUNTER
Patient was last seen at internal Medicine  PCP has moved to new office  Asks to update PCP in chart

## 2023-07-22 DIAGNOSIS — E11.65 TYPE 2 DIABETES MELLITUS WITH HYPERGLYCEMIA, WITH LONG-TERM CURRENT USE OF INSULIN (HCC): ICD-10-CM

## 2023-07-22 DIAGNOSIS — Z79.4 TYPE 2 DIABETES MELLITUS WITH HYPERGLYCEMIA, WITH LONG-TERM CURRENT USE OF INSULIN (HCC): ICD-10-CM

## 2023-07-24 RX ORDER — BLOOD SUGAR DIAGNOSTIC
STRIP MISCELLANEOUS
Qty: 150 STRIP | Refills: 0 | OUTPATIENT
Start: 2023-07-24

## 2023-08-24 ENCOUNTER — VBI (OUTPATIENT)
Dept: ADMINISTRATIVE | Facility: OTHER | Age: 53
End: 2023-08-24

## 2024-02-07 ENCOUNTER — VBI (OUTPATIENT)
Dept: ADMINISTRATIVE | Facility: OTHER | Age: 54
End: 2024-02-07

## 2024-03-01 ENCOUNTER — RA CDI HCC (OUTPATIENT)
Dept: OTHER | Facility: HOSPITAL | Age: 54
End: 2024-03-01

## 2024-04-26 DIAGNOSIS — I10 ESSENTIAL HYPERTENSION: ICD-10-CM

## 2024-04-29 ENCOUNTER — TELEPHONE (OUTPATIENT)
Dept: CARDIOLOGY CLINIC | Facility: CLINIC | Age: 54
End: 2024-04-29

## 2024-04-29 NOTE — TELEPHONE ENCOUNTER
Hi. Can you please send a message to our clerical team as well to schedule the pt for a follow up appt.  Thanks!

## 2024-04-29 NOTE — TELEPHONE ENCOUNTER
Name from pharmacy: NIFEDIPINE ER 60 MG TABLET         Will file in chart as: NIFEdipine ER (ADALAT CC) 60 MG 24 hr tablet    Sig: TAKE 1 TABLET BY MOUTH EVERY DAY    Disp: 90 tablet    Refills: 3    Start: 4/26/2024    Class: Normal    Non-formulary For: Essential hypertension    Last ordered: 1 year ago (4/6/2023) by Apolinar Rutherford MD    Last refill: 3/20/2024    Rx #: [id number]    Cardiovascular:  Calcium Channel Blockers [id number]/26/2024 12:57 PM   Protocol Details BP completed in the last 6 months    Valid encounter within last 6 months      To be filled at: [facility]/pharmacy #[id number] - [place], [place] - [address]   Please review and refill if appropriate.  Thanks!

## 2024-05-02 ENCOUNTER — VBI (OUTPATIENT)
Dept: ADMINISTRATIVE | Facility: OTHER | Age: 54
End: 2024-05-02

## 2024-12-17 ENCOUNTER — VBI (OUTPATIENT)
Dept: ADMINISTRATIVE | Facility: OTHER | Age: 54
End: 2024-12-17

## 2024-12-17 NOTE — TELEPHONE ENCOUNTER
12/17/24 2:45 PM     Chart reviewed for Hemoglobin A1c ; nothing is submitted to the patient's insurance at this time.     Bethanie Elliott   [facility]

## 2025-01-03 ENCOUNTER — VBI (OUTPATIENT)
Dept: ADMINISTRATIVE | Facility: OTHER | Age: 55
End: 2025-01-03

## 2025-01-03 NOTE — TELEPHONE ENCOUNTER
01/03/25 2:05 PM     Chart reviewed for Hemoglobin A1c ; nothing is submitted to the patient's insurance at this time.     Bethanie Elliott   [facility]

## 2025-03-09 ENCOUNTER — HOSPITAL ENCOUNTER (OUTPATIENT)
Dept: MRI IMAGING | Facility: HOSPITAL | Age: 55
Discharge: HOME | End: 2025-03-09
Payer: COMMERCIAL

## 2025-03-09 DIAGNOSIS — M54.50 LOW BACK PAIN, UNSPECIFIED BACK PAIN LATERALITY, UNSPECIFIED CHRONICITY, UNSPECIFIED WHETHER SCIATICA PRESENT: ICD-10-CM

## 2025-03-09 PROCEDURE — 72148 MRI LUMBAR SPINE W/O DYE: CPT

## 2025-04-07 DIAGNOSIS — I10 ESSENTIAL HYPERTENSION: ICD-10-CM
